# Patient Record
Sex: MALE | Race: WHITE | NOT HISPANIC OR LATINO | Employment: OTHER | URBAN - NONMETROPOLITAN AREA
[De-identification: names, ages, dates, MRNs, and addresses within clinical notes are randomized per-mention and may not be internally consistent; named-entity substitution may affect disease eponyms.]

---

## 2017-03-29 ENCOUNTER — OFFICE VISIT (OUTPATIENT)
Dept: CARDIOLOGY | Facility: CLINIC | Age: 82
End: 2017-03-29

## 2017-03-29 VITALS
BODY MASS INDEX: 27.07 KG/M2 | DIASTOLIC BLOOD PRESSURE: 69 MMHG | SYSTOLIC BLOOD PRESSURE: 137 MMHG | HEART RATE: 57 BPM | WEIGHT: 178.6 LBS | OXYGEN SATURATION: 98 % | HEIGHT: 68 IN

## 2017-03-29 DIAGNOSIS — I10 ESSENTIAL HYPERTENSION: ICD-10-CM

## 2017-03-29 DIAGNOSIS — I25.10 CORONARY ARTERY DISEASE INVOLVING NATIVE CORONARY ARTERY OF NATIVE HEART WITHOUT ANGINA PECTORIS: Primary | ICD-10-CM

## 2017-03-29 DIAGNOSIS — Z00.00 HEALTHCARE MAINTENANCE: ICD-10-CM

## 2017-03-29 DIAGNOSIS — R00.1 BRADYCARDIA: ICD-10-CM

## 2017-03-29 DIAGNOSIS — K59.09 OTHER CONSTIPATION: ICD-10-CM

## 2017-03-29 DIAGNOSIS — R68.89 COLD FEELING: ICD-10-CM

## 2017-03-29 DIAGNOSIS — H57.02 ANISOCORIA: ICD-10-CM

## 2017-03-29 DIAGNOSIS — R55 SYNCOPE, UNSPECIFIED SYNCOPE TYPE: ICD-10-CM

## 2017-03-29 PROBLEM — K59.00 CONSTIPATION: Status: ACTIVE | Noted: 2017-03-29

## 2017-03-29 PROCEDURE — 99204 OFFICE O/P NEW MOD 45 MIN: CPT | Performed by: INTERNAL MEDICINE

## 2017-03-29 PROCEDURE — 93000 ELECTROCARDIOGRAM COMPLETE: CPT | Performed by: INTERNAL MEDICINE

## 2017-03-29 RX ORDER — DOXAZOSIN MESYLATE 4 MG/1
4 TABLET ORAL NIGHTLY
COMMUNITY
Start: 2017-02-07 | End: 2023-02-16

## 2017-03-29 RX ORDER — RAMIPRIL 5 MG/1
5 CAPSULE ORAL DAILY
Qty: 30 CAPSULE | Refills: 3 | Status: SHIPPED | OUTPATIENT
Start: 2017-03-29 | End: 2017-05-30 | Stop reason: SDUPTHER

## 2017-03-29 RX ORDER — ASPIRIN 81 MG/1
81 TABLET ORAL DAILY
COMMUNITY

## 2017-03-29 RX ORDER — SIMVASTATIN 40 MG
40 TABLET ORAL NIGHTLY
COMMUNITY
End: 2020-09-02 | Stop reason: SDUPTHER

## 2017-03-29 RX ORDER — ALPRAZOLAM 1 MG/1
1 TABLET ORAL NIGHTLY PRN
COMMUNITY

## 2017-03-29 RX ORDER — RAMIPRIL 2.5 MG/1
CAPSULE ORAL DAILY
COMMUNITY
Start: 2017-03-02 | End: 2017-03-29

## 2017-03-29 NOTE — PROGRESS NOTES
Subjective   Israel Toure is a 84 y.o. male     Chief Complaint   Patient presents with   • Establish Care       PROBLEM LIST:     1. Coronary artery disease   1.1 acute MI 1990  2. Hypertension   3. COPD   4. carotid bruit BL   5. H/o syncope   Specialty Problems     None            HPI:  Mr. Juan Carlos Toure is an 84-year-old white male who presents today to [facility].    The patient has known atherosclerotic coronary artery disease and apparently underwent PTCA in 1990 after presenting with myocardial infarction following several months of angina.  He stopped smoking cigarettes at that time and has had no recurrence of similar chest pain.  Mr. Toure did extremely well for a number of years.  Approximately 2 years ago he had a syncopal episode.  He did not seek medical attention at that time.  Had no oral no palpitations no post ictal state or Paul's paralysis.    He continued to do well thereafter until January 2017.  At that time Mr. Toure was in the bathroom and had a syncopal episode.  Again he had no R, palpitations, or graying out.  He awoke on the floor without confusion, postictal state, or Paul's paralysis.  There is no loss of bowel or bladder control.  At that time Mr. Toure complained of a mild left precordial chest discomfort with radiation toward the left side of the neck.  He was admitted at [facility] and, because of borderline cardiac enzymes, underwent cardiac catheterization.  That study, performed by Dr. LOTTIE Peck, demonstrated chronic total occlusion of the right coronary artery with noncritical disease in the LAD and circumflex arteries.  Left ventricular ejection fraction was estimated at 40-45%.    Mr. Toure was taking Cardizem prior to his admission and the presumption was that he had a bradycardic syncopal episode.  Diltiazem was stopped, the patient was started on Altace, and was discharged in stable condition with no recurrent syncope and no  dizziness or presyncope.    Currently, Mr. Toure does well.  Since January he is been walking a half hour day on his treadmill and he has no chest discomfort or significant dyspnea.  He denies orthopnea or PND, he has very mild lower extremity edema when he has been on his feet for an extended period of time.  He has no palpitations, dizziness, presyncope or syncope.  He denies claudication or other symptoms of peripheral arterial disease or arterial embolic events.  A 30 day event recorder was performed through Dr. Peck's office after the patient's hospital discharge as well as a carotid duplex study.  The results of those studies are not currently available.                    CURRENT MEDICATION:    Current Outpatient Prescriptions   Medication Sig Dispense Refill   • ALPRAZolam (XANAX) 1 MG tablet Take 1 mg by mouth At Night As Needed for Anxiety.     • aspirin 81 MG EC tablet Take 81 mg by mouth Daily.     • doxazosin (CARDURA) 8 MG tablet Every Night.     • ramipril (ALTACE) 5 MG capsule 1 capsule Daily. 30 capsule 3   • simvastatin (ZOCOR) 80 MG tablet Take 80 mg by mouth Every Night.       No current facility-administered medications for this visit.        ALLERGIES:    Review of patient's allergies indicates no known allergies.    PAST MEDICAL HISTORY:    Past Medical History:   Diagnosis Date   • Acute MI    • COPD (chronic obstructive pulmonary disease)    • Diverticulosis    • Hypertension    • Hypomagnesemia    • Osteoarthritis    • Prostatitis    • Spinal stenosis        SURGICAL HISTORY:    Past Surgical History:   Procedure Laterality Date   • CARDIAC CATHETERIZATION     • CATARACT EXTRACTION     • HERNIA REPAIR     • SKIN CANCER EXCISION         SOCIAL HISTORY:    Social History     Social History   • Marital status:      Spouse name: N/A   • Number of children: N/A   • Years of education: N/A     Occupational History   • Not on file.     Social History Main Topics   • Smoking status:  "Former Smoker   • Smokeless tobacco: Not on file   • Alcohol use No   • Drug use: No   • Sexual activity: Defer     Other Topics Concern   • Not on file     Social History Narrative   • No narrative on file       FAMILY HISTORY:    Family History   Problem Relation Age of Onset   • No Known Problems Mother    • No Known Problems Father        Review of Systems   Constitutional: Negative.  Negative for chills, diaphoresis, fatigue and fever.   HENT: Negative.    Eyes: Positive for visual disturbance.   Respiratory: Negative.  Negative for chest tightness and shortness of breath (no orthopnea or PND).    Cardiovascular: Negative.  Negative for chest pain, palpitations and leg swelling.   Gastrointestinal: Positive for constipation (occasionally). Negative for abdominal pain, blood in stool (no melena, no hematuria, no hematemesis, no hematochezia ), diarrhea, nausea and vomiting.   Endocrine: Positive for cold intolerance. Negative for heat intolerance.   Genitourinary: Negative.    Musculoskeletal: Positive for myalgias.   Skin: Negative.    Allergic/Immunologic: Negative.    Neurological: Negative.  Negative for dizziness, tremors, seizures, syncope (1 epsisode in jan went to ER was followed by [facility] ), facial asymmetry, speech difficulty, weakness, light-headedness, numbness and headaches.   Hematological: Negative.    Psychiatric/Behavioral: Negative.        VISIT VITALS:    /69 (BP Location: Left arm, Patient Position: Sitting)  Pulse 57  Ht 68\" (172.7 cm)  Wt 178 lb 9.6 oz (81 kg)  SpO2 98%  BMI 27.16 kg/m2    RECENT LABS:    Objective       Physical Exam   Constitutional: He is oriented to person, place, and time. He appears well-developed and well-nourished. No distress.   HENT:   Head: Normocephalic and atraumatic.   Mouth/Throat: No oral lesions.   Eyes: Conjunctivae, EOM and lids are normal. Lids are everted and swept, no foreign bodies found. Right pupil is not reactive.   anisocoria  right " pupil dilated and does not react to light   Negative ptosis   Negative lid lag      Neck: Normal range of motion. Neck supple. Normal carotid pulses, no hepatojugular reflux and no JVD present. Carotid bruit is present (BL bruit louder left then right mid systolic, mid peaking). No thyroid mass and no thyromegaly present.   Cardiovascular: Normal heart sounds and intact distal pulses.   No extrasystoles are present. Exam reveals no gallop, no friction rub and no decreased pulses.    No murmur heard.  Pulses:       Radial pulses are 2+ on the right side, and 2+ on the left side.        Dorsalis pedis pulses are 2+ on the right side, and 2+ on the left side.        Posterior tibial pulses are 2+ on the right side, and 2+ on the left side.   Pulmonary/Chest: Effort normal and breath sounds normal. No respiratory distress. He has no decreased breath sounds. He has no wheezes. He has no rhonchi. He has no rales. He exhibits no tenderness.   Abdominal: Soft. Bowel sounds are normal. He exhibits no distension, no abdominal bruit and no mass. There is no tenderness.   Negative organomegaly      Musculoskeletal: He exhibits edema (1+ edema to above sock LLE).   Neurological: He is alert and oriented to person, place, and time. He has normal reflexes.   Skin: Skin is warm and dry. No rash noted. He is not diaphoretic. No erythema. No pallor.   Psychiatric: He has a normal mood and affect. His speech is normal and behavior is normal. Judgment and thought content normal. Cognition and memory are normal.   Nursing note and vitals reviewed.        ECG 12 Lead  Date/Time: 3/29/2017 1:36 PM  Performed by: YULI NORMAN  Authorized by: YULI NORMAN   Rhythm: sinus rhythm  Rate: normal  QRS axis: left  Comments: LVH by voltage  Cannot exclude IMIAU              Assessment/Plan    #1.  Coronary artery disease with chronic total occlusion of the right coronary artery, mild mildly to moderately depressed global LV systolic  function with an EF of 40-45% by catheterization, and noncritical disease in the left coronary artery.  The patient is currently on appropriate medical therapy.  Mr. Toure takes aspirin on an every other day basis when he bruises.  I recommended that he take that medication, as instructed, on a daily basis.  The patient is currently asymptomatic of ischemia and heart failure.    #2.  Episodic syncope.  According to hospital notes, providers felt that syncope was likely due to bradycardia with the patient on Cardizem.  Mr. Toure is had no recurrence of symptoms on Altace, and a 30 day event monitor was performed through Dr. Peck's office.  We will request those records.    #3.  Peripheral arterial disease with bilateral carotid bruits.  Dr. Peck also performed duplex study and we will request the results of that as well.    #4.  Mildly suboptimal blood pressure control.  As the patient is on a very low dose of Altace I would like to increase that to 5 mg daily and follow blood pressures at home.  The patient was given precautions reference symptomatic hypotension.    #5.  Mr. Toure will follow up with Dr. Heck as instructed, and in our office in 6-8 weeks or on a when necessary basis as discussed in detail today.               Diagnosis Plan   1. Coronary artery disease involving native coronary artery of native heart without angina pectoris  ECG 12 Lead    T3, Free    T4, Free    TSH    TSH    T4, Free    T3, Free   2. Healthcare maintenance  ECG 12 Lead    T3, Free    T4, Free    TSH    TSH    T4, Free    T3, Free   3. Cold feeling  T3, Free    T4, Free    TSH    TSH    T4, Free    T3, Free   4. Other constipation  T3, Free    T4, Free    TSH    TSH    T4, Free    T3, Free   5. Essential hypertension  T3, Free    T4, Free    TSH    TSH    T4, Free    T3, Free   6. Syncope, unspecified syncope type  T3, Free    T4, Free    TSH    TSH    T4, Free    T3, Free   7. Bradycardia     8. Anisocoria         Return  in about 2 months (around 5/29/2017), or if symptoms worsen or fail to improve, for Next scheduled follow up.         Braulio Burch MD

## 2017-05-30 ENCOUNTER — OFFICE VISIT (OUTPATIENT)
Dept: CARDIOLOGY | Facility: CLINIC | Age: 82
End: 2017-05-30

## 2017-05-30 VITALS
HEIGHT: 68 IN | SYSTOLIC BLOOD PRESSURE: 138 MMHG | OXYGEN SATURATION: 99 % | WEIGHT: 180.6 LBS | DIASTOLIC BLOOD PRESSURE: 69 MMHG | BODY MASS INDEX: 27.37 KG/M2 | HEART RATE: 56 BPM

## 2017-05-30 DIAGNOSIS — I25.10 CORONARY ARTERY DISEASE INVOLVING NATIVE CORONARY ARTERY OF NATIVE HEART WITHOUT ANGINA PECTORIS: Primary | ICD-10-CM

## 2017-05-30 DIAGNOSIS — I10 ESSENTIAL HYPERTENSION: ICD-10-CM

## 2017-05-30 PROCEDURE — 99213 OFFICE O/P EST LOW 20 MIN: CPT | Performed by: INTERNAL MEDICINE

## 2017-05-30 RX ORDER — TRAMADOL HYDROCHLORIDE 50 MG/1
50 TABLET ORAL AS NEEDED
COMMUNITY
Start: 2017-04-11

## 2017-05-30 RX ORDER — RAMIPRIL 5 MG/1
5 CAPSULE ORAL DAILY
Qty: 30 CAPSULE | Refills: 11 | Status: SHIPPED | OUTPATIENT
Start: 2017-05-30 | End: 2017-07-24 | Stop reason: SDUPTHER

## 2017-06-02 NOTE — PROGRESS NOTES
Subjective   Israel Toure is a 84 y.o. male     Chief Complaint   Patient presents with   • Follow-up     patient appears in office today for follow up        PROBLEM LIST:     1. Coronary artery disease   1.1 acute MI 1990  2. Hypertension   3. COPD   4. carotid bruit BL   5. H/o syncope     Specialty Problems        Cardiology Problems    Bradycardia        Coronary artery disease involving native coronary artery of native heart without angina pectoris        Essential hypertension        Syncope                HPI:  Mr. Toure returns for follow-up on the above problems.    He has had no recurrent presyncope or syncope.  He denies chest pain, orthopnea, PND, or change in mild lower extremity edema.  He has no palpitations, no symptoms of peripheral arterial disease nor of arterial embolic events.    We were able to review results of duplex study and event monitor.  The monitor demonstrated only occasional ventricular ectopic beats and sinus bradycardia with no significant pauses and no sustained dysrhythmia.  Duplex study demonstrated minimal carotid disease with antegrade flow both vertebral arteries.    Mr. Addison tolerated the increase in Altace without difficulty.  Her pressures remained well controlled.  By labs from March HDL cholesterol was 58 LDL cholesterol 69.                CURRENT MEDICATION:    Current Outpatient Prescriptions   Medication Sig Dispense Refill   • ALPRAZolam (XANAX) 1 MG tablet Take 1 mg by mouth At Night As Needed for Anxiety.     • aspirin 81 MG EC tablet Take 81 mg by mouth Daily.     • doxazosin (CARDURA) 8 MG tablet Every Night.     • ramipril (ALTACE) 5 MG capsule Take 1 capsule by mouth Daily. 30 capsule 11   • simvastatin (ZOCOR) 80 MG tablet Take 80 mg by mouth Every Night.     • traMADol (ULTRAM) 50 MG tablet As Needed.       No current facility-administered medications for this visit.        ALLERGIES:    Review of patient's allergies indicates no known  allergies.    PAST MEDICAL HISTORY:    Past Medical History:   Diagnosis Date   • Acute MI    • COPD (chronic obstructive pulmonary disease)    • Diverticulosis    • Hypertension    • Hypomagnesemia    • Osteoarthritis    • Prostatitis    • Spinal stenosis        SURGICAL HISTORY:    Past Surgical History:   Procedure Laterality Date   • CARDIAC CATHETERIZATION     • CATARACT EXTRACTION     • HERNIA REPAIR     • SKIN CANCER EXCISION         SOCIAL HISTORY:    Social History     Social History   • Marital status:      Spouse name: N/A   • Number of children: N/A   • Years of education: N/A     Occupational History   • Not on file.     Social History Main Topics   • Smoking status: Former Smoker   • Smokeless tobacco: Not on file   • Alcohol use No   • Drug use: No   • Sexual activity: Defer     Other Topics Concern   • Not on file     Social History Narrative       FAMILY HISTORY:    Family History   Problem Relation Age of Onset   • No Known Problems Mother    • No Known Problems Father        Review of Systems   Constitutional: Negative for fatigue.   HENT: Positive for hearing loss (wears hearing aides (forgot them today)).    Eyes: Positive for visual disturbance (wears reading glasses).   Respiratory: Negative for cough, chest tightness, shortness of breath and wheezing.    Cardiovascular: Negative.  Negative for chest pain, palpitations and leg swelling.   Gastrointestinal: Negative.  Negative for abdominal pain, nausea and vomiting.   Endocrine: Negative.  Negative for cold intolerance, heat intolerance and polyuria.   Genitourinary: Negative.  Negative for difficulty urinating, frequency and urgency.   Musculoskeletal: Positive for arthralgias, back pain, myalgias and neck pain. Negative for neck stiffness.   Skin: Negative.  Negative for rash and wound.   Allergic/Immunologic: Negative.  Negative for environmental allergies and food allergies.   Neurological: Negative.  Negative for dizziness,  "syncope, weakness, light-headedness, numbness and headaches.   Hematological: Bruises/bleeds easily.   Psychiatric/Behavioral: Negative for agitation, confusion and sleep disturbance. The patient is not nervous/anxious.        VISIT VITALS:    /69 (BP Location: Left arm, Patient Position: Sitting)  Pulse 56  Ht 68\" (172.7 cm)  Wt 180 lb 9.6 oz (81.9 kg)  SpO2 99%  BMI 27.46 kg/m2    RECENT LABS:        Physical Exam   Constitutional: He is oriented to person, place, and time. He appears well-developed and well-nourished. No distress.   HENT:   Head: Normocephalic and atraumatic.   Eyes: Conjunctivae, EOM and lids are normal. Pupils are equal, round, and reactive to light. Lids are everted and swept, no foreign bodies found.   Neck: Normal range of motion. Neck supple. Normal carotid pulses, no hepatojugular reflux and no JVD present. Carotid bruit is not present. No thyroid mass and no thyromegaly present.   Cardiovascular: S1 normal, S2 normal, normal heart sounds, intact distal pulses and normal pulses.   No extrasystoles are present. Exam reveals no gallop, no S3, no S4, no distant heart sounds, no friction rub and no decreased pulses.    No murmur heard.  Pulses:       Radial pulses are 2+ on the right side, and 2+ on the left side.        Dorsalis pedis pulses are 2+ on the right side, and 2+ on the left side.        Posterior tibial pulses are 2+ on the right side, and 2+ on the left side.   Pulmonary/Chest: Effort normal and breath sounds normal. No respiratory distress. He has no decreased breath sounds. He has no wheezes. He has no rhonchi. He has no rales. He exhibits no tenderness.   Abdominal: Soft. Bowel sounds are normal. He exhibits no distension, no abdominal bruit and no mass. There is no tenderness.   Negative organomegaly      Musculoskeletal: Normal range of motion. He exhibits edema (trace edema BLE). He exhibits no tenderness or deformity.   Lymphadenopathy:     He has no cervical " adenopathy.     He has no axillary adenopathy.   Neurological: He is alert and oriented to person, place, and time. He has normal reflexes.   Skin: Skin is warm and dry. No rash noted. He is not diaphoretic. No erythema. No pallor.   Psychiatric: He has a normal mood and affect. His speech is normal and behavior is normal. Judgment and thought content normal. Cognition and memory are normal.   Nursing note and vitals reviewed.      Procedures      Assessment/Plan    #1.  Mr. Toure is doing well from the standpoint of risk factor modification.  Head no significant disease on carotid duplex study, and blood pressure and cholesterol are equal.  Medications are appropriate, no current changes are indicated.    #2.  The patient has had no recurrence of presyncope or syncope has current medical therapy.  A vent monitor revealed no significant dysrhythmic substrate.  Therefore, we will continue current therapy.    #3.  From the standpoint of coronary artery disease Mr. Toure is also stable.  He is asymptomatic of ischemia heart failure and dysrhythmia with chronic total occlusion of the right coronary artery and noncritical left coronary disease documented by ANA a catheterization in January of this year.  We will continue aggressive efforts at risk modification.    #4.  Mr. Toure will follow-up with Dr. Heck as instructed, and in our office on a yearly basis, or on a when necessary basis for symptoms as discussed in detail today.             Diagnosis Plan   1. Coronary artery disease involving native coronary artery of native heart without angina pectoris     2. Essential hypertension         Return in about 1 year (around 5/30/2018), or if symptoms worsen or fail to improve, for Next scheduled follow up.         Braulio Burch MD

## 2017-07-24 ENCOUNTER — TELEPHONE (OUTPATIENT)
Dept: CARDIOLOGY | Facility: CLINIC | Age: 82
End: 2017-07-24

## 2017-07-24 DIAGNOSIS — I10 ESSENTIAL HYPERTENSION: Primary | ICD-10-CM

## 2017-07-24 RX ORDER — RAMIPRIL 5 MG/1
5 CAPSULE ORAL DAILY
Qty: 30 CAPSULE | Refills: 11 | Status: SHIPPED | OUTPATIENT
Start: 2017-07-24 | End: 2017-10-30 | Stop reason: SDUPTHER

## 2017-07-24 NOTE — TELEPHONE ENCOUNTER
----- Message from Milady MAURICE Schleicher sent at 7/24/2017 11:50 AM EDT -----  Contact: pt  Came in office needing refill on ramipril 5mg. Needs them to last until his next appt. Please advise. kg

## 2017-10-30 DIAGNOSIS — I10 ESSENTIAL HYPERTENSION: ICD-10-CM

## 2017-10-30 RX ORDER — RAMIPRIL 5 MG/1
5 CAPSULE ORAL DAILY
Qty: 90 CAPSULE | Refills: 1 | Status: SHIPPED | OUTPATIENT
Start: 2017-10-30 | End: 2018-05-07 | Stop reason: SDUPTHER

## 2018-02-12 ENCOUNTER — OFFICE VISIT (OUTPATIENT)
Dept: CARDIOLOGY | Facility: CLINIC | Age: 83
End: 2018-02-12

## 2018-02-12 VITALS
HEIGHT: 69 IN | HEART RATE: 59 BPM | BODY MASS INDEX: 25.24 KG/M2 | DIASTOLIC BLOOD PRESSURE: 66 MMHG | SYSTOLIC BLOOD PRESSURE: 148 MMHG | OXYGEN SATURATION: 98 % | WEIGHT: 170.4 LBS

## 2018-02-12 DIAGNOSIS — R00.0 TACHYCARDIA: ICD-10-CM

## 2018-02-12 DIAGNOSIS — R00.2 PALPITATIONS: ICD-10-CM

## 2018-02-12 DIAGNOSIS — I25.118 CORONARY ARTERY DISEASE OF NATIVE ARTERY OF NATIVE HEART WITH STABLE ANGINA PECTORIS (HCC): ICD-10-CM

## 2018-02-12 DIAGNOSIS — I10 ESSENTIAL HYPERTENSION: ICD-10-CM

## 2018-02-12 DIAGNOSIS — I73.9 CLAUDICATION OF BOTH LOWER EXTREMITIES (HCC): ICD-10-CM

## 2018-02-12 PROCEDURE — 93000 ELECTROCARDIOGRAM COMPLETE: CPT | Performed by: PHYSICIAN ASSISTANT

## 2018-02-12 PROCEDURE — 99214 OFFICE O/P EST MOD 30 MIN: CPT | Performed by: PHYSICIAN ASSISTANT

## 2018-02-12 RX ORDER — TAMSULOSIN HYDROCHLORIDE 0.4 MG/1
1 CAPSULE ORAL DAILY
COMMUNITY
Start: 2017-12-13 | End: 2018-08-03

## 2018-02-12 NOTE — PROGRESS NOTES
Problem list     Subjective   Israel Toure is a 84 y.o. male     Chief Complaint   Patient presents with   • Follow-up     patient appears in office today for yearly follow up    • Coronary Artery Disease   • Hypertension   Problem List:  1.  Coronary artery disease.  1.1.  MI, 1990s, inadequate data.  The patient apparently had catheterization with no follow-up mechanical intervention.  1.2.  Repeat catheterization, 2017, in the setting of unstable angina.  The patient had an occluded right coronary artery with adequate collateral flow.  He had nonobstructive disease in the LAD and circumflex.  Medical management was recommended.  2.  Ischemic cardiomyopathy, estimated EF at 40-45%.  3.  Hypertension  4.  Dyslipidemia  5.  Chronic palpitations with questionable diagnosis of atrial fibrillation, inadequate data.    HPI  The patient present back today for evaluation and follow-up.  He is worried with episodes of palpitations/tachycardia.  He describes episodes of irregularities followed by a very tachycardic sensation.  This can last several minutes and then tends to resolve without any type of intervention.  He has been seen through the emergency room previously, and 2017, for similar symptoms.  He was admitted at that time and had catheterization as above.  The daughter, who is with him today, reports that there was mention of atrial fibrillation, but I do not see a formal diagnosis of that from that hospitalization.  The patient reports that his symptoms of palpitations of been very minimal over the past 2 months.  He relates that to not taking tramadol.  He feels that that medication induces episodes of dysrhythmias.  The patient has no chest pain or tightness of significance.  He has only rare chest discomfort when his palpitations and tachycardic episodes are very significant.  He reports stable dyspnea.  He has no failure symptoms.  He has had no further syncopal episodes, which again was experienced back  in 2017.  He denies further complaints otherwise.    Current Outpatient Prescriptions   Medication Sig Dispense Refill   • ALPRAZolam (XANAX) 1 MG tablet Take 1 mg by mouth At Night As Needed for Anxiety.     • aspirin 81 MG EC tablet Take 81 mg by mouth Daily.     • doxazosin (CARDURA) 8 MG tablet Take 4 mg by mouth Every Night.     • ramipril (ALTACE) 5 MG capsule Take 1 capsule by mouth Daily. 90 capsule 1   • simvastatin (ZOCOR) 80 MG tablet Take 40 mg by mouth Every Night.     • tamsulosin (FLOMAX) 0.4 MG capsule 24 hr capsule 1 capsule Daily.     • traMADol (ULTRAM) 50 MG tablet Take 50 mg by mouth As Needed for Moderate Pain .       No current facility-administered medications for this visit.        Review of patient's allergies indicates no known allergies.    Past Medical History:   Diagnosis Date   • Acute MI    • Cancer     melanoma   • COPD (chronic obstructive pulmonary disease)    • Diverticulosis    • Hypertension    • Hypomagnesemia    • Osteoarthritis    • Prostatitis    • Spinal stenosis        Social History     Social History   • Marital status:      Spouse name: N/A   • Number of children: N/A   • Years of education: N/A     Occupational History   • Not on file.     Social History Main Topics   • Smoking status: Former Smoker   • Smokeless tobacco: Never Used   • Alcohol use No   • Drug use: No   • Sexual activity: Defer     Other Topics Concern   • Not on file     Social History Narrative       Family History   Problem Relation Age of Onset   • No Known Problems Mother    • No Known Problems Father        Review of Systems   Constitutional: Negative.  Negative for fatigue.   HENT: Positive for hearing loss (B hearing loss). Negative for congestion, rhinorrhea, sneezing and sore throat.    Eyes: Positive for visual disturbance (wears reading glasses).   Respiratory: Positive for shortness of breath (SOA with exertion). Negative for apnea, cough, chest tightness and wheezing.   "  Cardiovascular: Positive for palpitations (occasional palpitations/flutters). Negative for chest pain (denies CP) and leg swelling.   Gastrointestinal: Positive for abdominal pain (with constipation ) and constipation (chronic constipation ). Negative for abdominal distention, nausea and vomiting.   Endocrine: Negative.  Negative for cold intolerance, heat intolerance, polyphagia and polyuria.   Genitourinary: Negative.  Negative for difficulty urinating, frequency and urgency.   Musculoskeletal: Positive for back pain (due to spinal stenosis) and neck pain (due to spinal stenosis). Negative for arthralgias and neck stiffness.   Skin: Negative.  Negative for rash and wound.   Allergic/Immunologic: Negative.  Negative for environmental allergies and food allergies.   Neurological: Positive for syncope (x2 syncopal epsidoes over 1 year ago). Negative for dizziness, weakness, light-headedness and headaches.   Hematological: Bruises/bleeds easily (bruises and bleeds easily).   Psychiatric/Behavioral: Negative for agitation, confusion and sleep disturbance (denies waking up smothering/SOA). The patient is nervous/anxious (easily nervous/anxious).        Objective   Vitals:    02/12/18 1323   BP: 148/66   BP Location: Left arm   Patient Position: Sitting   Pulse: 59   SpO2: 98%   Weight: 77.3 kg (170 lb 6.4 oz)   Height: 175.3 cm (69\")      /66 (BP Location: Left arm, Patient Position: Sitting)  Pulse 59  Ht 175.3 cm (69\")  Wt 77.3 kg (170 lb 6.4 oz)  SpO2 98%  BMI 25.16 kg/m2   Lab Results (most recent)     None        Physical Exam   Constitutional: He is oriented to person, place, and time. He appears well-developed and well-nourished. No distress.   HENT:   Head: Normocephalic and atraumatic.   Eyes: Conjunctivae, EOM and lids are normal. Pupils are equal, round, and reactive to light. Lids are everted and swept, no foreign bodies found.   Neck: Normal range of motion. Neck supple. Normal carotid pulses, " no hepatojugular reflux and no JVD present. Carotid bruit is not present. No thyroid mass and no thyromegaly present.   Cardiovascular: S1 normal, S2 normal, normal heart sounds, intact distal pulses and normal pulses.   No extrasystoles are present. Exam reveals no gallop, no S3, no S4, no distant heart sounds, no friction rub and no decreased pulses.    No murmur heard.  Pulses:       Radial pulses are 2+ on the right side, and 2+ on the left side.        Dorsalis pedis pulses are 2+ on the right side, and 2+ on the left side.        Posterior tibial pulses are 2+ on the right side, and 2+ on the left side.   Pulmonary/Chest: Effort normal and breath sounds normal. No respiratory distress. He has no decreased breath sounds. He has no wheezes. He has no rhonchi. He has no rales. He exhibits no tenderness.   Abdominal: Soft. Bowel sounds are normal. He exhibits no distension, no abdominal bruit and no mass. There is no tenderness.   Negative organomegaly      Musculoskeletal: Normal range of motion. He exhibits edema (trace edema BLE). He exhibits no tenderness or deformity.   Lymphadenopathy:     He has no cervical adenopathy.     He has no axillary adenopathy.   Neurological: He is alert and oriented to person, place, and time. He has normal reflexes.   Skin: Skin is warm and dry. No rash noted. He is not diaphoretic. No erythema. No pallor.   Psychiatric: He has a normal mood and affect. His speech is normal and behavior is normal. Judgment and thought content normal. Cognition and memory are normal.   Nursing note and vitals reviewed.        Procedure     ECG 12 Lead  Date/Time: 2/12/2018 1:30 PM  Performed by: MADISON LYONS  Authorized by: MADISON LYONS   Comments: HTN               Assessment/Plan      Diagnosis Plan   1. Palpitations  Adult Transthoracic Echo Complete W/ Cont if Necessary Per Protocol    Cardiac Event Monitor    US Arterial Doppler Lower Extremity Left    US Arterial Doppler Lower  Extremity Right    Adult Transthoracic Echo Complete W/ Cont if Necessary Per Protocol    Cardiac Event Monitor    US Arterial Doppler Lower Extremity Left    US Arterial Doppler Lower Extremity Right   2. Tachycardia  Adult Transthoracic Echo Complete W/ Cont if Necessary Per Protocol    Cardiac Event Monitor    US Arterial Doppler Lower Extremity Left    US Arterial Doppler Lower Extremity Right    Adult Transthoracic Echo Complete W/ Cont if Necessary Per Protocol    Cardiac Event Monitor    US Arterial Doppler Lower Extremity Left    US Arterial Doppler Lower Extremity Right   3. Essential hypertension  ECG 12 Lead    Adult Transthoracic Echo Complete W/ Cont if Necessary Per Protocol    Cardiac Event Monitor    US Arterial Doppler Lower Extremity Left    US Arterial Doppler Lower Extremity Right    Adult Transthoracic Echo Complete W/ Cont if Necessary Per Protocol    Cardiac Event Monitor    US Arterial Doppler Lower Extremity Left    US Arterial Doppler Lower Extremity Right   4. Coronary artery disease of native artery of native heart with stable angina pectoris  Adult Transthoracic Echo Complete W/ Cont if Necessary Per Protocol    Cardiac Event Monitor    US Arterial Doppler Lower Extremity Left    US Arterial Doppler Lower Extremity Right    Adult Transthoracic Echo Complete W/ Cont if Necessary Per Protocol    Cardiac Event Monitor    US Arterial Doppler Lower Extremity Left    US Arterial Doppler Lower Extremity Right   5. Claudication of both lower extremities  US Arterial Doppler Lower Extremity Left    US Arterial Doppler Lower Extremity Right    US Arterial Doppler Lower Extremity Left    US Arterial Doppler Lower Extremity Right       I would like to schedule for an echo and an event monitor given all symptoms as above.  The patient is concerned about his palpitations/tachycardia.  We need to evaluate for dysrhythmias.  For now, I will make no changes in medications.  I do feel that the patient  would benefit from beta blocker therapy.  I do not want institute that until I can see results of the above.  Also, not mentioned above, the patient has pain in the lower extremities possibly representing claudication type issues.  Pulses are not significantly diminished but I would like to schedule for an arterial duplex of lower extremities bilaterally.  For any complications, the patient will call to us.  Otherwise, further pending above.               Electronically signed by:

## 2018-02-21 ENCOUNTER — HOSPITAL ENCOUNTER (OUTPATIENT)
Dept: CARDIOLOGY | Facility: HOSPITAL | Age: 83
Discharge: HOME | End: 2018-02-21

## 2018-02-21 ENCOUNTER — HOSPITAL ENCOUNTER (OUTPATIENT)
Dept: CARDIOLOGY | Facility: HOSPITAL | Age: 83
Discharge: HOME | End: 2018-02-21
Admitting: PHYSICIAN ASSISTANT

## 2018-02-21 ENCOUNTER — OUTSIDE FACILITY SERVICE (OUTPATIENT)
Dept: CARDIOLOGY | Facility: CLINIC | Age: 83
End: 2018-02-21

## 2018-02-21 DIAGNOSIS — I25.118 CORONARY ARTERY DISEASE OF NATIVE ARTERY OF NATIVE HEART WITH STABLE ANGINA PECTORIS (HCC): ICD-10-CM

## 2018-02-21 DIAGNOSIS — I10 ESSENTIAL HYPERTENSION: ICD-10-CM

## 2018-02-21 DIAGNOSIS — I73.9 CLAUDICATION OF BOTH LOWER EXTREMITIES (HCC): ICD-10-CM

## 2018-02-21 LAB
MAXIMAL PREDICTED HEART RATE: 136 BPM
STRESS TARGET HR: 116 BPM

## 2018-02-21 PROCEDURE — 93306 TTE W/DOPPLER COMPLETE: CPT

## 2018-02-21 PROCEDURE — 93306 TTE W/DOPPLER COMPLETE: CPT | Performed by: INTERNAL MEDICINE

## 2018-02-21 PROCEDURE — 93925 LOWER EXTREMITY STUDY: CPT | Performed by: INTERNAL MEDICINE

## 2018-02-21 PROCEDURE — 93925 LOWER EXTREMITY STUDY: CPT

## 2018-02-23 ENCOUNTER — DOCUMENTATION (OUTPATIENT)
Dept: CARDIOLOGY | Facility: CLINIC | Age: 83
End: 2018-02-23

## 2018-02-23 DIAGNOSIS — I73.9 CLAUDICATION OF LEFT LOWER EXTREMITY (HCC): Primary | ICD-10-CM

## 2018-02-23 NOTE — PROGRESS NOTES
Patient was notified of echo results. Patient was notified to keep follow up apt as scheduled. -ASHLEY;LIZ

## 2018-03-07 ENCOUNTER — OUTSIDE FACILITY SERVICE (OUTPATIENT)
Dept: CARDIOLOGY | Facility: CLINIC | Age: 83
End: 2018-03-07

## 2018-03-07 PROCEDURE — 93228 REMOTE 30 DAY ECG REV/REPORT: CPT | Performed by: INTERNAL MEDICINE

## 2018-04-10 ENCOUNTER — OFFICE VISIT (OUTPATIENT)
Dept: CARDIOLOGY | Facility: CLINIC | Age: 83
End: 2018-04-10

## 2018-04-10 VITALS
BODY MASS INDEX: 25.65 KG/M2 | SYSTOLIC BLOOD PRESSURE: 148 MMHG | DIASTOLIC BLOOD PRESSURE: 68 MMHG | WEIGHT: 173.2 LBS | HEART RATE: 59 BPM | HEIGHT: 69 IN | OXYGEN SATURATION: 99 %

## 2018-04-10 DIAGNOSIS — I10 ESSENTIAL HYPERTENSION: ICD-10-CM

## 2018-04-10 DIAGNOSIS — I25.10 CORONARY ARTERY DISEASE INVOLVING NATIVE CORONARY ARTERY OF NATIVE HEART WITHOUT ANGINA PECTORIS: Primary | ICD-10-CM

## 2018-04-10 DIAGNOSIS — I25.5 ISCHEMIC CARDIOMYOPATHY: ICD-10-CM

## 2018-04-10 DIAGNOSIS — I73.9 PVD (PERIPHERAL VASCULAR DISEASE) (HCC): ICD-10-CM

## 2018-04-10 PROCEDURE — 99214 OFFICE O/P EST MOD 30 MIN: CPT | Performed by: PHYSICIAN ASSISTANT

## 2018-04-10 RX ORDER — CARVEDILOL 3.12 MG/1
3.12 TABLET ORAL 2 TIMES DAILY
Qty: 60 TABLET | Refills: 5 | Status: SHIPPED | OUTPATIENT
Start: 2018-04-10 | End: 2018-10-19 | Stop reason: SDUPTHER

## 2018-04-10 NOTE — PATIENT INSTRUCTIONS

## 2018-04-10 NOTE — PROGRESS NOTES
Problem list     Subjective   Israel Toure is a 85 y.o. male     Chief Complaint   Patient presents with   • Follow-up     patient appears in office today for follow up test results    • Palpitations   Problem List:  1.  Coronary artery disease.  1.1.  MI, 1990s, inadequate data.  The patient apparently had catheterization with no follow-up mechanical intervention.  1.2.  Repeat catheterization, 2017, in the setting of unstable angina.  The patient had an occluded right coronary artery with adequate collateral flow.  He had nonobstructive disease in the LAD and circumflex.  Medical management was recommended.  2.  Ischemic cardiomyopathy, estimated EF at 40-45%.  3.  Hypertension  4.  Dyslipidemia  5.  Chronic palpitations with questionable diagnosis of atrial fibrillation, inadequate data.    HPI  The patient presents in today for follow-up on event monitor, echo, and lower extremity arterial duplex study.  He was scheduled for those studies because of shortness of air, to reevaluate systolic function, and evaluate the patient's complaints of leg pain.  Echocardiogram suggested an EF of 35-40%.  Event monitor indicated PVCs.  The patient had an episode of nonsustained V. tach.  Lower extremity arterial duplex suggested moderate to moderately severe disease bilateral lower extremities.  He was scheduled for CTA to evaluate that further.  CTA suggested borderline hemodynamically significant disease and bilateral lower extremity is.  He presents today to discuss those tests.  We reviewed the patient's vascular disease status.  We offered him consideration for vaginal surgery referral.  He would like to hold on that.  I'm also concerned with his EF at 35-40% and nonsustained V. tach by event monitor.  The patient does report a history of syncope ×2.  We discussed EP evaluation.  He wants to hold on that for now as well.  The patient has stable dyspnea.  He denies chest pain.  He has no failure symptoms.  He has no  further complaints otherwise.    Current Outpatient Prescriptions   Medication Sig Dispense Refill   • ALPRAZolam (XANAX) 1 MG tablet Take 1 mg by mouth At Night As Needed for Anxiety.     • aspirin 81 MG EC tablet Take 81 mg by mouth Daily.     • doxazosin (CARDURA) 8 MG tablet Take 4 mg by mouth Every Night.     • ramipril (ALTACE) 5 MG capsule Take 1 capsule by mouth Daily. 90 capsule 1   • simvastatin (ZOCOR) 80 MG tablet Take 40 mg by mouth Every Night.     • tamsulosin (FLOMAX) 0.4 MG capsule 24 hr capsule 1 capsule Daily.     • traMADol (ULTRAM) 50 MG tablet Take 50 mg by mouth As Needed for Moderate Pain .       No current facility-administered medications for this visit.        Review of patient's allergies indicates no known allergies.    Past Medical History:   Diagnosis Date   • Acute MI    • Cancer     melanoma   • COPD (chronic obstructive pulmonary disease)    • Diverticulosis    • Hypertension    • Hypomagnesemia    • Osteoarthritis    • Prostatitis    • Spinal stenosis        Social History     Social History   • Marital status:      Spouse name: N/A   • Number of children: N/A   • Years of education: N/A     Occupational History   • Not on file.     Social History Main Topics   • Smoking status: Former Smoker   • Smokeless tobacco: Never Used   • Alcohol use No   • Drug use: No   • Sexual activity: Defer     Other Topics Concern   • Not on file     Social History Narrative   • No narrative on file       Family History   Problem Relation Age of Onset   • No Known Problems Mother    • No Known Problems Father        Review of Systems   Constitutional: Negative for fatigue.   HENT: Positive for hearing loss (B) hearing loss. Negative for congestion, rhinorrhea, sneezing and sore throat.    Eyes: Positive for visual disturbance (wears reading glasses).   Respiratory: Positive for shortness of breath (with exertion only). Negative for apnea, cough, chest tightness and wheezing.    Cardiovascular:  "Positive for leg swelling (B swelling/edema). Negative for chest pain (denies CP) and palpitations.   Gastrointestinal: Negative.  Negative for abdominal distention, abdominal pain, nausea and vomiting.   Endocrine: Negative.  Negative for cold intolerance, heat intolerance, polyphagia and polyuria.   Genitourinary: Negative.  Negative for difficulty urinating, frequency and urgency.   Musculoskeletal: Positive for arthralgias (joints), back pain (due to arthritis; spinal stenosis), neck pain (due to arthritis) and neck stiffness (due to arthritis).   Skin: Negative.  Negative for rash and wound.   Allergic/Immunologic: Negative.  Negative for environmental allergies and food allergies.   Neurological: Negative.  Negative for dizziness, weakness, light-headedness and headaches.   Hematological: Bruises/bleeds easily (bruises easily).   Psychiatric/Behavioral: Negative.  Negative for agitation, confusion and sleep disturbance (denies waking up smothering/SOA). The patient is not nervous/anxious.        Objective   Vitals:    04/10/18 1439   BP: 148/68   BP Location: Left arm   Patient Position: Sitting   Pulse: 59   SpO2: 99%   Weight: 78.6 kg (173 lb 3.2 oz)   Height: 175.3 cm (69\")      /68 (BP Location: Left arm, Patient Position: Sitting)   Pulse 59   Ht 175.3 cm (69\")   Wt 78.6 kg (173 lb 3.2 oz)   SpO2 99%   BMI 25.58 kg/m²    Lab Results (most recent)     None        Physical Exam   Constitutional: He is oriented to person, place, and time. He appears well-developed and well-nourished. No distress.   HENT:   Head: Normocephalic and atraumatic.   Eyes: Conjunctivae, EOM and lids are normal. Pupils are equal, round, and reactive to light. Lids are everted and swept, no foreign bodies found.   Neck: Normal range of motion. Neck supple. Normal carotid pulses, no hepatojugular reflux and no JVD present. Carotid bruit is not present. No thyroid mass and no thyromegaly present.   Cardiovascular: S1 normal, " S2 normal, normal heart sounds, intact distal pulses and normal pulses.   No extrasystoles are present. Exam reveals no gallop, no S3, no S4, no distant heart sounds, no friction rub and no decreased pulses.    No murmur heard.  Pulses:       Radial pulses are 2+ on the right side, and 2+ on the left side.        Dorsalis pedis pulses are 2+ on the right side, and 2+ on the left side.        Posterior tibial pulses are 2+ on the right side, and 2+ on the left side.   Pulmonary/Chest: Effort normal and breath sounds normal. No respiratory distress. He has no decreased breath sounds. He has no wheezes. He has no rhonchi. He has no rales. He exhibits no tenderness.   Abdominal: Soft. Bowel sounds are normal. He exhibits no distension, no abdominal bruit and no mass. There is no tenderness.   Negative organomegaly      Musculoskeletal: Normal range of motion. He exhibits edema (trace edema BLE). He exhibits no tenderness or deformity.   Lymphadenopathy:     He has no cervical adenopathy.     He has no axillary adenopathy.   Neurological: He is alert and oriented to person, place, and time. He has normal reflexes.   Skin: Skin is warm and dry. No rash noted. He is not diaphoretic. No erythema. No pallor.   Psychiatric: He has a normal mood and affect. His speech is normal and behavior is normal. Judgment and thought content normal. Cognition and memory are normal.   Nursing note and vitals reviewed.        Procedure   Procedures       Assessment/Plan      Diagnosis Plan   1. Coronary artery disease involving native coronary artery of native heart without angina pectoris     2. Ischemic cardiomyopathy     3. Essential hypertension     4. PVD (peripheral vascular disease)         The patient has diffuse lower extremity vascular disease.  He does not want to pursue vascular surgery evaluation at this time.  Should he change his mind, he will call.  I am also concerned given his EF of 35%, nonsustained V. tach by the  monitor, and history of syncope ×2.  We discussed consideration for EP evaluation.  He would like to hold on that.  We discussed risk of sudden cardiac death.  With his ischemic cardiomyopathy, he would benefit from beta blocker therapy.  He has had a degree of bradycardia in the past.  I'll start very low-dose Coreg and he will monitor heart rate and blood pressures very closely at home.  We will see him back in 4-6 weeks just to reevaluate course.  He will call for any issues prior to follow-up.           Discussed the patient's BMI with him. BMI is above normal parameters. Follow-up plan includes:  educational material and referral to primary care.             Electronically signed by:

## 2018-05-07 ENCOUNTER — TELEPHONE (OUTPATIENT)
Dept: CARDIOLOGY | Facility: CLINIC | Age: 83
End: 2018-05-07

## 2018-05-07 DIAGNOSIS — I10 ESSENTIAL HYPERTENSION: ICD-10-CM

## 2018-05-07 RX ORDER — RAMIPRIL 5 MG/1
5 CAPSULE ORAL DAILY
Qty: 90 CAPSULE | Refills: 1 | Status: SHIPPED | OUTPATIENT
Start: 2018-05-07 | End: 2018-10-26 | Stop reason: SDUPTHER

## 2018-05-07 NOTE — TELEPHONE ENCOUNTER
----- Message from Kelly Monahan sent at 5/7/2018 11:47 AM EDT -----  Contact: PATIENT  THE PATIENT STOPPED BY AND REQUESTED A REFILL ON HIS RAMIPRIL.  HE USES [facility] IN [place].  WITH ANY ISSUES HE CAN BE REACHED [phone].  THANKS

## 2018-07-05 ENCOUNTER — TELEPHONE (OUTPATIENT)
Dept: CARDIOLOGY | Facility: CLINIC | Age: 83
End: 2018-07-05

## 2018-07-05 NOTE — TELEPHONE ENCOUNTER
PER VERBAL ORDER MADISON LYONS PA-C ,  PATIENTS PCP CALLED HIM VIA CELL WHILE ON VACATION AND DISCUSSED PATIENT.     MADISON LYONS PA-C REQ A LIMITED ECHO BE PERFORMED ON PATIENT.     I ATTEMPTED TO CALL PATIENT AT 0240 PM , NO ANSWER, LEFT VM TO RETURN CALL. -;[facility]

## 2018-07-05 NOTE — TELEPHONE ENCOUNTER
PATIENT CALLED BACK @ 0424 PM. HE STATED WHILE AT  OFFICE THEY WENT AHEAD AND SCHEDULED HER WITH DR. CEE. PER VERBAL ORDER MADISON LYONS PA-C WE WILL WAIT TILL PATIENT SEE'S DR. CEE AND SEE WHAT HIS PLAN OF ACTION IS. -;[facility]

## 2018-08-03 ENCOUNTER — OFFICE VISIT (OUTPATIENT)
Dept: CARDIOLOGY | Facility: CLINIC | Age: 83
End: 2018-08-03

## 2018-08-03 VITALS
HEART RATE: 55 BPM | WEIGHT: 163 LBS | DIASTOLIC BLOOD PRESSURE: 66 MMHG | HEIGHT: 67 IN | BODY MASS INDEX: 25.58 KG/M2 | OXYGEN SATURATION: 98 % | SYSTOLIC BLOOD PRESSURE: 140 MMHG

## 2018-08-03 DIAGNOSIS — I25.10 CORONARY ARTERY DISEASE INVOLVING NATIVE CORONARY ARTERY OF NATIVE HEART WITHOUT ANGINA PECTORIS: Primary | ICD-10-CM

## 2018-08-03 DIAGNOSIS — I10 ESSENTIAL HYPERTENSION: ICD-10-CM

## 2018-08-03 DIAGNOSIS — I42.9 CARDIOMYOPATHY, UNSPECIFIED TYPE (HCC): ICD-10-CM

## 2018-08-03 DIAGNOSIS — R55 SYNCOPE, UNSPECIFIED SYNCOPE TYPE: ICD-10-CM

## 2018-08-03 DIAGNOSIS — I47.29 NSVT (NONSUSTAINED VENTRICULAR TACHYCARDIA) (HCC): ICD-10-CM

## 2018-08-03 PROCEDURE — 99214 OFFICE O/P EST MOD 30 MIN: CPT | Performed by: INTERNAL MEDICINE

## 2018-08-03 PROCEDURE — 93000 ELECTROCARDIOGRAM COMPLETE: CPT | Performed by: INTERNAL MEDICINE

## 2018-08-03 NOTE — PROGRESS NOTES
Israel Toure  2/26/1933    There is no work phone number on file.    08/03/2018    [facility] CARDIOLOGY    Braulio Heck MD  [address] [place] [address]    REFERRING DOCTOR: Dr. Burch      Patient ID: Israel Toure is a 85 y.o. male.    Chief Complaint: Cardiomyopathy  Problem List:  1.  Coronary artery disease.   1.1.  MI, 1990s, inadequate data.  The patient apparently had catheterization with no follow-up mechanical intervention.   1.2.  Repeat catheterization, 2017, in the setting of unstable angina.  The patient had an occluded right coronary artery with adequate collateral flow.  He had nonobstructive disease in the LAD and circumflex.  Medical management was recommended.  2.  Ischemic cardiomyopathy   2.1 Echocardiogram ~2017 EF 40-45%, Dr. Peck at [facility]- data deficit   2.2 Echocardiogram 2/21/18: EF 35-40%, grade II diastolic dysfunction, LA moderately dilated, RA mildly dilated, mild MR, mild TR, mild WV   2.3 Holter monitor 2/2018: 4 beat run of NSVT, asymptomatic   2.4 RBBB, -160 ms  3.  Hypertension  4.  Dyslipidemia  5.  Chronic palpitations with questionable diagnosis of atrial fibrillation, inadequate data.    Allergies:   No Known Allergies    Current Medications:    Current Outpatient Prescriptions:   •  ALPRAZolam (XANAX) 1 MG tablet, Take 2 mg by mouth At Night As Needed for Anxiety., Disp: , Rfl:   •  aspirin 81 MG EC tablet, Take 81 mg by mouth Daily., Disp: , Rfl:   •  carvedilol (COREG) 3.125 MG tablet, Take 1 tablet by mouth 2 (Two) Times a Day., Disp: 60 tablet, Rfl: 5  •  doxazosin (CARDURA) 8 MG tablet, Take 4 mg by mouth Every Night., Disp: , Rfl:   •  ramipril (ALTACE) 5 MG capsule, Take 1 capsule by mouth Daily., Disp: 90 capsule, Rfl: 1  •  simvastatin (ZOCOR) 80 MG tablet, Take 40 mg by mouth Every Night., Disp: , Rfl:   •  traMADol (ULTRAM) 50 MG tablet, Take 50 mg by mouth As Needed for Moderate Pain ., Disp: , Rfl:      History of Present Illness: Ms. Toure is an 86 y/o male with the above noted pmhx who is seen today in consultation at the request of Mike Patricia PA-C for cardiomyopathy. He has had a reduced EF of 40-45% in 2017 although we do not have copy of this. He had repeat echocardiogram on 2/21/18 with an EF of 35-40%. He has been on optimal medical therapy with  Altace for greater than 90 days (started Feb. 2018) and on Coreg since April 2018. This was initially not started s/t bradycardia and hypotension at times. He wore Holter monitor in February which demonstrated 4 beat run of NSVT, asymptotic at the time but no other arrhythmia noted. EKG demonstrates chronic RBBB with QRS of 150-160 ms. He has had two episodes of syncope. One was about three years ago and the second was 1.5 years ago. Prior to onset he felt a warm sensation come over his body. No palpitations prior to onset or other symptoms. With one episode he was evaluated at [facility] and noted to have some VT on telemetry but it is unclear if this corresponded to his symptoms. He is pretty active. He is able to do his yard work and household chores. His daughter states that he does get tired and has to take breaks. He does not feel like he has reduced energy level. He denies SOP, CP, PND and orthopnea.     Both episodes of syncope seem to be consistent with neurocardiogenic with prodrome of LH and a feeling of doom with no sig palpitations. Also dealing with LE pain and taking pain pills he feels better. NSVT on monitor with no symptoms of up to 4 beats.     The following portions of the patient's history were reviewed and updated as appropriate: allergies, current medications, past family history, past medical history, past social history, past surgical history and problem list.      Past Medical History:   Diagnosis Date   • Acute MI    • Cancer (CMS/[facility])     melanoma   • COPD (chronic obstructive pulmonary disease) (CMS/[facility])    •  Diverticulosis    • Hypertension    • Hypomagnesemia    • Osteoarthritis    • Prostatitis    • Spinal stenosis        Past Surgical History:   Procedure Laterality Date   • CARDIAC CATHETERIZATION     • CATARACT EXTRACTION     • HERNIA REPAIR     • SKIN CANCER EXCISION         Social History     Social History   • Marital status:      Spouse name: N/A   • Number of children: N/A   • Years of education: N/A     Occupational History   • Not on file.     Social History Main Topics   • Smoking status: Former Smoker     Types: Cigarettes     Quit date: 1990   • Smokeless tobacco: Never Used   • Alcohol use No   • Drug use: No   • Sexual activity: Defer     Other Topics Concern   • Not on file     Social History Narrative   • No narrative on file       Family History   Problem Relation Age of Onset   • No Known Problems Mother    • No Known Problems Father        REVIEW OF SYSTEMS:   CONSTITUTIONAL: No weight loss, fever, chills. + Fatigue  HEENT: Eyes: No visual loss, blurred vision, double vision or yellow sclerae. Ears, Nose, Throat: No hearing loss, sneezing, congestion, runny nose or sore throat.   SKIN: No rash or itching.     RESPIRATORY: No shortness of breath, hemoptysis, cough or sputum.   GASTROINTESTINAL: No anorexia, nausea, vomiting or diarrhea. No abdominal pain, bright red blood per rectum or melena.  GENITOURINARY: No burning on urination, hematuria or increased frequency.  NEUROLOGICAL: No headache, dizziness, syncope, paralysis, ataxia, numbness or tingling in the extremities. No change in bowel or bladder control.   MUSCULOSKELETAL: + leg pain. No muscle pain. + back pain. No joint pain or stiffness.   HEMATOLOGIC: No anemia, bleeding or bruising.   LYMPHATICS: No enlarged nodes. No history of splenectomy.   PSYCHIATRIC: No history of depression, anxiety, hallucinations.   ENDOCRINOLOGIC: No reports of sweating, cold or heat intolerance. No polyuria or polydipsia.   Ext: No edema or  "bruising      The patient's old records including ambulatory rhythm recordings (ECGs, Holter/event monitor) were reviewed and discussed.           Objective:       Vitals:    08/03/18 1227   BP: 140/66   BP Location: Left arm   Patient Position: Sitting   Pulse: 55   SpO2: 98%   Weight: 73.9 kg (163 lb)   Height: 170.2 cm (67\")         Physical Exam:  Constitutional: Elderly male in no distress.   HEENT: Normocephalic. Atraumatic. Conjunctivae are normal. No scleral icterus. Mucus membranes pink and moist.  Neck: Normal carotid pulses. No JVD present. Carotid bruit is not present. No thyromegaly present.   Cardiovascular: RRR. S1 normal, S2 normal. No rubs, murmurs or gallops. PMI is not displaced. Pulses: Radial pulses are 2+ on the right side, and 2+ on the left side.    Dorsalis pedis pulses are 2+ on the right side, and 2+ on the left side.   Pulmonary: Non-labored. Clear to auscultation; no rales, wheezes or rhonchi.   Abdominal: Soft. Non tender. Non distended. Normoactive bowel sounds. No masses. No hepatosplenomegaly.   Musculoskeletal:  exhibits no edema, tenderness or deformity.   EXT: No swelling  Neurological: is alert and oriented to person, place, and time.   Skin: Skin is warm and dry. No rash noted. Non diaphoretic. No cyanosis or erythema. No pallor. Nails show no clubbing.   Psychiatric: Normal mood and affect.Speech is normal and behavior is normal.    Lab Review:   Results for orders placed or performed in visit on 02/12/18   Adult Transthoracic Echo Complete W/ Cont if Necessary Per Protocol   Result Value Ref Range    Target HR (85%) 116 bpm    Max. Pred. HR (100%) 136 bpm         ECG 12 Lead  Date/Time: 8/20/2018 5:51 PM  Performed by: MYAH REGAN  Authorized by: MYAH REGAN   Rhythm: sinus bradycardia  Conduction: right bundle branch block and 1st degree  Comments: HR 48 bpm   msec                Diagnosis:   1. Cardiomyopathy  2. NSVT  3. Syncope  4. CAD  5. HTN  Assessment & " Plan:   1. Cardiomyopathy, EF 40-45% in 2017, repeat echo 2/2018 with EF of 35-40%, on Altace since Feb and Coreg since April. NYHA class II symptoms. RBBB with QRS of 160-170 ms. Recheck echo to reeval EF.   2. NSVT: 4 beat run of NSVT captured on monitor 2/2018 but asymptomatic at the time. Reportedly had NSVT on telemetry during hospitalization in 2017 ([facility]) but difficult to tell if symptomatic at that time.   3. Syncope: two episodes of syncope with a prodrome of flushing/warm sensation coming over his body that occurred while standing. Denies other associated symptoms. Sounds like neurocardiogenic in nature to me. Last one was in Jan 2017, First one was about 3 yrs. Non Recently.   4. CAD, stable  5. HTN, stable    Plan:  --> Repeat echocardiogram  --> Long discussion with patient regarding risks and benefits of BiV ICD vs. BiV PPM pending echocardiogram results. I think at this time patient does not want a ICD and EF is 35-40% and not less than 35% so may be best to proceed with a BiV PPM if EF still on lower end and symptoms. He is fully agreeable and agrees no ICD for now. Consider BiV PPM in the near future for mixed CMP 35-40%, FC II symptoms and RBBB of 170 msec.   --> Continue optimal medical therapy with BB and ACE    I will call patient once Echo is completed to discuss further as noted above.            CC: Dr. Burch    Scribed for Guillermo Bynum DO by RAJ Douglas, APRN. 8/3/2018  1:05 PM     IGuillermo DO, personally performed the services described in this documentation as scribed by the above named individual in my presence, and it is both accurate and complete.  8/3/2018  1:21 PM    Guillermo Bynum DO  1:21 PM  08/03/18

## 2018-09-06 ENCOUNTER — HOSPITAL ENCOUNTER (OUTPATIENT)
Dept: CARDIOLOGY | Facility: HOSPITAL | Age: 83
Discharge: HOME | End: 2018-09-06
Admitting: INTERNAL MEDICINE

## 2018-09-06 ENCOUNTER — OUTSIDE FACILITY SERVICE (OUTPATIENT)
Dept: CARDIOLOGY | Facility: CLINIC | Age: 83
End: 2018-09-06

## 2018-09-06 LAB
MAXIMAL PREDICTED HEART RATE: 135 BPM
STRESS TARGET HR: 115 BPM

## 2018-09-06 PROCEDURE — 93306 TTE W/DOPPLER COMPLETE: CPT | Performed by: INTERNAL MEDICINE

## 2018-09-06 PROCEDURE — 93306 TTE W/DOPPLER COMPLETE: CPT

## 2018-10-19 RX ORDER — CARVEDILOL 3.12 MG/1
TABLET ORAL
Qty: 60 TABLET | Refills: 5 | Status: SHIPPED | OUTPATIENT
Start: 2018-10-19 | End: 2018-10-26 | Stop reason: SDUPTHER

## 2018-10-26 DIAGNOSIS — I10 ESSENTIAL HYPERTENSION: ICD-10-CM

## 2018-10-26 RX ORDER — RAMIPRIL 5 MG/1
5 CAPSULE ORAL DAILY
Qty: 90 CAPSULE | Refills: 2 | Status: SHIPPED | OUTPATIENT
Start: 2018-10-26 | End: 2018-11-26 | Stop reason: SDUPTHER

## 2018-10-26 RX ORDER — CARVEDILOL 3.12 MG/1
3.12 TABLET ORAL 2 TIMES DAILY
Qty: 180 TABLET | Refills: 2 | Status: SHIPPED | OUTPATIENT
Start: 2018-10-26 | End: 2020-02-11

## 2018-11-26 ENCOUNTER — OFFICE VISIT (OUTPATIENT)
Dept: CARDIOLOGY | Facility: CLINIC | Age: 83
End: 2018-11-26

## 2018-11-26 VITALS
WEIGHT: 167.4 LBS | HEART RATE: 54 BPM | SYSTOLIC BLOOD PRESSURE: 142 MMHG | OXYGEN SATURATION: 99 % | DIASTOLIC BLOOD PRESSURE: 77 MMHG | HEIGHT: 67 IN | BODY MASS INDEX: 26.27 KG/M2

## 2018-11-26 DIAGNOSIS — I25.10 CORONARY ARTERY DISEASE INVOLVING NATIVE CORONARY ARTERY OF NATIVE HEART WITHOUT ANGINA PECTORIS: Primary | ICD-10-CM

## 2018-11-26 DIAGNOSIS — I10 ESSENTIAL HYPERTENSION: ICD-10-CM

## 2018-11-26 DIAGNOSIS — I25.5 ISCHEMIC CARDIOMYOPATHY: ICD-10-CM

## 2018-11-26 DIAGNOSIS — R06.02 SHORTNESS OF BREATH: ICD-10-CM

## 2018-11-26 PROCEDURE — 99213 OFFICE O/P EST LOW 20 MIN: CPT | Performed by: PHYSICIAN ASSISTANT

## 2018-11-26 RX ORDER — RAMIPRIL 5 MG/1
5 CAPSULE ORAL DAILY
Qty: 90 CAPSULE | Refills: 3 | Status: SHIPPED | OUTPATIENT
Start: 2018-11-26 | End: 2019-12-11 | Stop reason: SDUPTHER

## 2018-11-26 RX ORDER — TAMSULOSIN HYDROCHLORIDE 0.4 MG/1
0.4 CAPSULE ORAL DAILY
COMMUNITY
Start: 2018-10-01 | End: 2021-09-29

## 2018-11-26 NOTE — PATIENT INSTRUCTIONS

## 2018-11-26 NOTE — PROGRESS NOTES
Problem list     Subjective   Israel Toure is a 85 y.o. male     Chief Complaint   Patient presents with   • Follow-up     patient appears in office today for follow up test results    • Coronary Artery Disease   Problem List:  1.  Coronary artery disease.  1.1.  MI, 1990s, inadequate data.  The patient apparently had catheterization with no follow-up mechanical intervention.  1.2.  Repeat catheterization, 2017, in the setting of unstable angina.  The patient had an occluded right coronary artery with adequate collateral flow.  He had nonobstructive disease in the LAD and circumflex.  Medical management was recommended.  2.  Ischemic cardiomyopathy, estimated EF at 30-35%.  3.  Hypertension  4.  Dyslipidemia  5.  Chronic palpitations with questionable diagnosis of atrial fibrillation, inadequate data.    HPI    The patient presents in for routine evaluation follow-up.  He did have an echocardiogram several weeks ago.  That was ordered through EP services to reevaluate systolic function.  He had been recommended, pending echo results, to at least consider ICD therapy.  The patient did not want that.  His follow-up echo suggested an EF at about 30%.  Findings were stable otherwise for this treatment.  He tells me that since starting carvedilol therapy, he has felt much improved.  His symptoms of palpitations even seem less.  He seems to have more energy.  He reports seemingly less dyspnea.  Currently, he has no chest pain.  He has no PND orthopnea.  He reports largely stable blood pressures despite not having random approachable for the last 2 weeks.  He requests refills of that.  Otherwise, he has no further complaints.  Current Outpatient Medications   Medication Sig Dispense Refill   • ALPRAZolam (XANAX) 1 MG tablet Take 2 mg by mouth At Night As Needed for Anxiety.     • aspirin 81 MG EC tablet Take 81 mg by mouth Daily.     • carvedilol (COREG) 3.125 MG tablet Take 1 tablet by mouth 2 (Two) Times a Day. 180  tablet 2   • doxazosin (CARDURA) 8 MG tablet Take 4 mg by mouth Every Night.     • ramipril (ALTACE) 5 MG capsule Take 1 capsule by mouth Daily. 90 capsule 3   • simvastatin (ZOCOR) 80 MG tablet Take 40 mg by mouth Every Night.     • tamsulosin (FLOMAX) 0.4 MG capsule 24 hr capsule Take 0.4 mg by mouth Daily.     • traMADol (ULTRAM) 50 MG tablet Take 50 mg by mouth As Needed for Moderate Pain .       No current facility-administered medications for this visit.        Patient has no known allergies.    Past Medical History:   Diagnosis Date   • Acute MI (CMS/HCC)    • Cancer (CMS/HCC)     melanoma   • COPD (chronic obstructive pulmonary disease) (CMS/HCC)    • Diverticulosis    • Hypertension    • Hypomagnesemia    • Osteoarthritis    • Prostatitis    • Spinal stenosis        Social History     Socioeconomic History   • Marital status:      Spouse name: Not on file   • Number of children: Not on file   • Years of education: Not on file   • Highest education level: Not on file   Social Needs   • Financial resource strain: Not on file   • Food insecurity - worry: Not on file   • Food insecurity - inability: Not on file   • Transportation needs - medical: Not on file   • Transportation needs - non-medical: Not on file   Occupational History   • Not on file   Tobacco Use   • Smoking status: Former Smoker     Types: Cigarettes     Last attempt to quit: 1990     Years since quittin.9   • Smokeless tobacco: Never Used   Substance and Sexual Activity   • Alcohol use: No   • Drug use: No   • Sexual activity: Defer   Other Topics Concern   • Not on file   Social History Narrative   • Not on file       Family History   Problem Relation Age of Onset   • No Known Problems Mother    • No Known Problems Father        Review of Systems   Constitutional: Negative.  Negative for fatigue.   HENT: Negative.  Negative for congestion, rhinorrhea, sneezing and sore throat.    Eyes: Negative.  Negative for visual disturbance.  "  Respiratory: Negative.  Negative for apnea, cough, chest tightness, shortness of breath (SOA on exertion only) and wheezing.    Cardiovascular: Positive for leg swelling (BLE swelling/edema on occasion). Negative for chest pain (denies CP) and palpitations (denies palpitations).   Gastrointestinal: Negative.  Negative for abdominal distention, abdominal pain, nausea and vomiting.   Endocrine: Negative.  Negative for cold intolerance and heat intolerance.   Genitourinary: Negative.  Negative for difficulty urinating, frequency and urgency.   Musculoskeletal: Positive for arthralgias (joints) and back pain (back pain from arthritis). Negative for myalgias, neck pain and neck stiffness.   Skin: Negative.  Negative for rash and wound.   Allergic/Immunologic: Negative.  Negative for environmental allergies and food allergies.   Neurological: Negative.  Negative for dizziness, syncope, weakness, light-headedness and headaches.   Hematological: Bruises/bleeds easily (bruises and bleeds easily).   Psychiatric/Behavioral: Negative for agitation, confusion and sleep disturbance (denies waking up smothering/SOA). The patient is nervous/anxious (easily nervous/anxious).        Objective   Vitals:    11/26/18 1513   BP: 142/77   BP Location: Left arm   Patient Position: Sitting   Pulse: 54   SpO2: 99%   Weight: 75.9 kg (167 lb 6.4 oz)   Height: 170.2 cm (67\")      /77 (BP Location: Left arm, Patient Position: Sitting)   Pulse 54   Ht 170.2 cm (67\")   Wt 75.9 kg (167 lb 6.4 oz)   SpO2 99%   BMI 26.22 kg/m²    Lab Results (most recent)     None        Physical Exam   Constitutional: He is oriented to person, place, and time. He appears well-developed and well-nourished. No distress.   HENT:   Head: Normocephalic and atraumatic.   Eyes: Conjunctivae, EOM and lids are normal. Pupils are equal, round, and reactive to light. Lids are everted and swept, no foreign bodies found.   Neck: Normal range of motion. Neck supple. " Normal carotid pulses, no hepatojugular reflux and no JVD present. Carotid bruit is not present. No thyroid mass and no thyromegaly present.   Cardiovascular: S1 normal, S2 normal, normal heart sounds, intact distal pulses and normal pulses.  No extrasystoles are present. Exam reveals no gallop, no S3, no S4, no distant heart sounds, no friction rub and no decreased pulses.   No murmur heard.  Pulses:       Radial pulses are 2+ on the right side, and 2+ on the left side.        Dorsalis pedis pulses are 2+ on the right side, and 2+ on the left side.        Posterior tibial pulses are 2+ on the right side, and 2+ on the left side.   Pulmonary/Chest: Effort normal and breath sounds normal. No respiratory distress. He has no decreased breath sounds. He has no wheezes. He has no rhonchi. He has no rales. He exhibits no tenderness.   Abdominal: Soft. Bowel sounds are normal. He exhibits no distension, no abdominal bruit and no mass. There is no tenderness.   Negative organomegaly      Musculoskeletal: Normal range of motion. He exhibits edema (trace edema BLE). He exhibits no tenderness or deformity.   Lymphadenopathy:     He has no cervical adenopathy.     He has no axillary adenopathy.   Neurological: He is alert and oriented to person, place, and time. He has normal reflexes.   Skin: Skin is warm and dry. No rash noted. He is not diaphoretic. No erythema. No pallor.   Psychiatric: He has a normal mood and affect. His speech is normal and behavior is normal. Judgment and thought content normal. Cognition and memory are normal.   Nursing note and vitals reviewed.        Procedure   Procedures       Assessment/Plan      Diagnosis Plan   1. Coronary artery disease involving native coronary artery of native heart without angina pectoris     2. Essential hypertension  ramipril (ALTACE) 5 MG capsule   3. Ischemic cardiomyopathy     4. Shortness of breath         I did give the patient refills for ramipril.  We reviewed echo  findings, as outlined above.  He has seen EP services for consideration for ICD and would not want that at this time, acknowledging risk without doing so.  From general cardiovascular standpoint, the patient denies symptoms of angina, failure, or dysrhythmias.  I feel nothing further is indicated.  We will continue medications.  For any complications, he will call.  Otherwise, we can see him back in 6 months, sooner for complications.           Patient's Body mass index is 26.22 kg/m². BMI is above normal parameters. Recommendations include: educational material and referral to primary care.             Electronically signed by:

## 2019-01-04 ENCOUNTER — OFFICE VISIT (OUTPATIENT)
Dept: CARDIOLOGY | Facility: CLINIC | Age: 84
End: 2019-01-04

## 2019-01-04 VITALS
HEART RATE: 60 BPM | HEIGHT: 67 IN | BODY MASS INDEX: 25.58 KG/M2 | WEIGHT: 163 LBS | SYSTOLIC BLOOD PRESSURE: 124 MMHG | DIASTOLIC BLOOD PRESSURE: 50 MMHG

## 2019-01-04 DIAGNOSIS — I25.10 CORONARY ARTERY DISEASE INVOLVING NATIVE CORONARY ARTERY OF NATIVE HEART WITHOUT ANGINA PECTORIS: ICD-10-CM

## 2019-01-04 DIAGNOSIS — R55 SYNCOPE, UNSPECIFIED SYNCOPE TYPE: ICD-10-CM

## 2019-01-04 DIAGNOSIS — I47.29 NSVT (NONSUSTAINED VENTRICULAR TACHYCARDIA) (HCC): Primary | ICD-10-CM

## 2019-01-04 DIAGNOSIS — R00.1 BRADYCARDIA: ICD-10-CM

## 2019-01-04 DIAGNOSIS — I25.5 ISCHEMIC CARDIOMYOPATHY: ICD-10-CM

## 2019-01-04 PROCEDURE — 99214 OFFICE O/P EST MOD 30 MIN: CPT | Performed by: INTERNAL MEDICINE

## 2019-01-04 NOTE — PROGRESS NOTES
Israel Toure  2/26/1933    There is no work phone number on file.    1/4/2019    [facility] CARDIOLOGY    Braulio Heck MD  [address] [place] [address]    REFERRING DOCTOR: Dr. Burch      Patient ID: Israel Toure is a 85 y.o. male.    Chief Complaint: Cardiomyopathy  Problem List:  1.  Coronary artery disease.   1.1.  MI, 1990s, inadequate data.  The patient apparently had catheterization with no follow-up mechanical intervention.   1.2.  Repeat catheterization, 2017, in the setting of unstable angina.  The patient had an occluded right coronary artery with adequate collateral flow.  He had nonobstructive disease in the LAD and circumflex.  Medical management was recommended.  2.  Ischemic cardiomyopathy   2.1 Echocardiogram ~2017 EF 40-45%, Dr. Peck at [facility]- data deficit   2.2 Echocardiogram 2/21/18: EF 35-40%, grade II diastolic dysfunction, LA moderately dilated, RA mildly dilated, mild MR, mild TR, mild DC   2.3 Holter monitor 2/2018: 4 beat run of NSVT, asymptomatic   2.4 RBBB, -160 ms              2.5 Echocardiogram September 2018.  Ejection fraction 25-30%.  LA mildly dilated.  Mild aortic stenosis.  Mild to moderate mitral regurgitation.  3.  Hypertension  4.  Dyslipidemia  5.  Chronic palpitations with questionable diagnosis of atrial fibrillation, inadequate data.    Allergies:   No Known Allergies    Current Medications:    Current Outpatient Medications:   •  ALPRAZolam (XANAX) 1 MG tablet, Take 2 mg by mouth At Night As Needed for Anxiety., Disp: , Rfl:   •  aspirin 81 MG EC tablet, Take 81 mg by mouth Daily., Disp: , Rfl:   •  carvedilol (COREG) 3.125 MG tablet, Take 1 tablet by mouth 2 (Two) Times a Day., Disp: 180 tablet, Rfl: 2  •  doxazosin (CARDURA) 8 MG tablet, Take 4 mg by mouth Every Night., Disp: , Rfl:   •  ramipril (ALTACE) 5 MG capsule, Take 1 capsule by mouth Daily., Disp: 90 capsule, Rfl: 3  •  simvastatin (ZOCOR) 80 MG  tablet, Take 40 mg by mouth Every Night., Disp: , Rfl:   •  tamsulosin (FLOMAX) 0.4 MG capsule 24 hr capsule, Take 0.4 mg by mouth Daily., Disp: , Rfl:   •  traMADol (ULTRAM) 50 MG tablet, Take 50 mg by mouth As Needed for Moderate Pain ., Disp: , Rfl:     History of Present Illness: Ms. Toure is an 84 y/o male with the above noted pmhx who is seen today for follow up for cardiomyopathy. History as follows: He has had a reduced EF of 40-45% in 2017 although we do not have copy of this. He had repeat echocardiogram on 2/21/18 with an EF of 35-40%. He has been on optimal medical therapy with  Altace for greater than 90 days (started Feb. 2018) and on Coreg since April 2018. This was initially not started s/t bradycardia and hypotension at times. He wore Holter monitor in February which demonstrated 4 beat run of NSVT, asymptotic at the time but no other arrhythmia noted. EKG demonstrates chronic RBBB with QRS of 150-160 ms. He has had two episodes of syncope. One was about three years ago and the second was 1.5 years ago. Prior to onset he felt a warm sensation come over his body. No palpitations prior to onset or other symptoms. With one episode he was evaluated at [facility] and noted to have some VT on telemetry but it is unclear if this corresponded to his symptoms. He is pretty active. He is able to do his yard work and household chores. His daughter states that he does get tired and has to take breaks. He does not feel like he has reduced energy level. He denies SOP, CP, PND and orthopnea.     Both episodes of syncope seem to be consistent with neurocardiogenic with prodrome of LH and a feeling of doom with no sig palpitations. Also dealing with LE pain and taking pain pills he feels better. NSVT on monitor with no symptoms of up to 4 beats.     Echocardiogram done in September showed ejection fraction of 25-30 percent.  On previous visit he has decided against an ICD and is here today for  "follow-up visit as noted above.  He is to maintain on optimal medical therapy as noted that he can tolerate. Biggest issue is still dealing with some fatigue and alittle less energy. Also, dealing with some SOB , no sig CP noted.   FC III symptoms noted.     The following portions of the patient's history were reviewed and updated as appropriate: allergies, current medications, past family history, past medical history, past social history, past surgical history and problem list.      Past Medical History:   Diagnosis Date   • Acute MI (CMS/HCC)    • Cancer (CMS/HCC)     melanoma   • COPD (chronic obstructive pulmonary disease) (CMS/HCC)    • Diverticulosis    • Hypertension    • Hypomagnesemia    • Osteoarthritis    • Prostatitis    • Spinal stenosis          The patient's old records including ambulatory rhythm recordings (ECGs, Holter/event monitor) were reviewed and discussed.           Objective:       Vitals:    01/04/19 1247   BP: 124/50   BP Location: Left arm   Patient Position: Sitting   Pulse: 60   Weight: 73.9 kg (163 lb)   Height: 170.2 cm (67\")         Physical Exam:  Constitutional: Elderly male in no distress.   HEENT: Normocephalic. Atraumatic. Conjunctivae are normal. No scleral icterus. Mucus membranes pink and moist.  Neck: Normal carotid pulses. No JVD present. Carotid bruit is not present. No thyromegaly present.   Cardiovascular: RRR. S1 normal, S2 normal. No rubs, murmurs or gallops. PMI is not displaced. Pulses: Radial pulses are 2+ on the right side, and 2+ on the left side.    Dorsalis pedis pulses are 2+ on the right side, and 2+ on the left side.   Pulmonary: Non-labored. Clear to auscultation; no rales, wheezes or rhonchi.   Abdominal: Soft. Non tender. Non distended. Normoactive bowel sounds. No masses. No hepatosplenomegaly.   Musculoskeletal:  exhibits no edema, tenderness or deformity.   EXT: No swelling  Neurological: is alert and oriented to person, place, and time.   Skin: Skin " is warm and dry. No rash noted. Non diaphoretic. No cyanosis or erythema. No pallor. Nails show no clubbing.   Psychiatric: Normal mood and affect.Speech is normal and behavior is normal.    Lab Review:   Results for orders placed or performed in visit on 08/03/18   Adult Transthoracic Echo Complete W/ Cont if Necessary Per Protocol   Result Value Ref Range    Target HR (85%) 115 bpm    Max. Pred. HR (100%) 135 bpm       Procedures        Diagnosis:   1. Cardiomyopathy  2. NSVT  3. Syncope  4. CAD  5. HTN  Assessment & Plan:   1. Cardiomyopathy, EF 40-45% in 2017, repeat echo 2/2018 with EF of 35-40% and most recently 25-30% in Sept 2018. NYHA class III symptoms. RBBB with QRS of 160-170 ms, LAFB.  2. NSVT: 4 beat run of NSVT captured on monitor 2/2018 but asymptomatic at the time. Reportedly had NSVT on telemetry during hospitalization in 2017 ([facility]) but difficult to tell if symptomatic at that time.   3. Syncope: two episodes of syncope with a prodrome of flushing/warm sensation coming over his body that occurred while standing. Denies other associated symptoms. Sounds like neurocardiogenic in nature to me. Last one was in Jan 2017, First one was about 3 yrs. Non Recently.   4. CAD, stable  5. HTN, stable    Plan:  --> Long discussion with patient regarding risks and benefits of BiV ICD vs. BiV PPM. I think at this time patient does not want a ICD as mentioned. He will call us next week to let us know what he has decided. I think one of the other devices would be best for him since his symptoms are worsening and would be leaning towards a BiV PPM to help his symptoms.   --> Continue optimal medical therapy with BB and ACE    Will follow up after discussion with patient next week. If no device will follow up in a few months.          Guillermo Bynum,   1:40 PM  01/04/19

## 2019-05-03 ENCOUNTER — OFFICE VISIT (OUTPATIENT)
Dept: CARDIOLOGY | Facility: CLINIC | Age: 84
End: 2019-05-03

## 2019-05-03 VITALS
OXYGEN SATURATION: 99 % | HEIGHT: 67 IN | SYSTOLIC BLOOD PRESSURE: 150 MMHG | DIASTOLIC BLOOD PRESSURE: 82 MMHG | WEIGHT: 163 LBS | HEART RATE: 52 BPM | BODY MASS INDEX: 25.58 KG/M2

## 2019-05-03 DIAGNOSIS — I25.10 CORONARY ARTERY DISEASE INVOLVING NATIVE CORONARY ARTERY OF NATIVE HEART WITHOUT ANGINA PECTORIS: ICD-10-CM

## 2019-05-03 DIAGNOSIS — R55 VASOVAGAL SYNCOPE: ICD-10-CM

## 2019-05-03 DIAGNOSIS — I25.5 ISCHEMIC CARDIOMYOPATHY: Primary | ICD-10-CM

## 2019-05-03 DIAGNOSIS — I50.22 CHRONIC SYSTOLIC CHF (CONGESTIVE HEART FAILURE), NYHA CLASS 3 (HCC): ICD-10-CM

## 2019-05-03 PROCEDURE — 99213 OFFICE O/P EST LOW 20 MIN: CPT | Performed by: PHYSICIAN ASSISTANT

## 2019-05-03 NOTE — PROGRESS NOTES
[facility]   OFFICE NOTE      Israel Toure  2/26/1933  PCP: Braulio Heck MD    SUBJECTIVE:   Israel Toure is a 86 y.o. male seen for a follow up visit regarding the following:     CC:ICM    Problem List:  1.  Coronary artery disease.              1.1.  MI, 1990s, inadequate data.  The patient apparently had catheterization with no follow-up mechanical intervention.              1.2.  Repeat catheterization, 2017, in the setting of unstable angina.  The patient had an occluded right coronary artery with adequate collateral flow.  He had nonobstructive disease in the LAD and circumflex.  Medical management was recommended.  2.  Ischemic cardiomyopathy              2.1 Echocardiogram ~2017 EF 40-45%, Dr. Peck at [facility]- data deficit              2.2 Echocardiogram 2/21/18: EF 35-40%, grade II diastolic dysfunction, LA moderately dilated, RA mildly dilated, mild MR, mild TR, mild RI              2.3 Holter monitor 2/2018: 4 beat run of NSVT, asymptomatic              2.4 RBBB, -160 ms              2.5 Echocardiogram September 2018.  Ejection fraction 25-30%.  LA mildly dilated.  Mild aortic stenosis.  Mild to moderate mitral regurgitation.  3.  Hypertension  4.  Dyslipidemia  5.  Chronic palpitations with questionable diagnosis of atrial fibrillation, inadequate data.  6. PVD     HPI:   Mr. Toure returns for follow-up for his ischemic cardiomyopathy, NSVT, CAD, hypertension, and syncope.  He was last seen in January with Dr. Nevarez.  At that visit, Dr. Bynum discussed BiV pacemaker implant improvement of his heart failure symptoms and EF.  The patient wanted to think about this and has not pursued as of yet.  Since then, he thinks is doing a little better. He states that he works in his yard for about 2-3 hours and then has to rest. He states that he also has a lot of back pain. No chest pain or syncopal episodes. No recent hospitalizations or ER  visits. He has mild swelling in his right ankle which does not bother him. No issues with PND or orthopnea. No bleeding issues on ASA. No CVA like symptoms.         ROS:  Review of Symptoms:  General: no recent weight loss/gain, weakness + fatigue  Skin: no rashes, lumps, or other skin changes  HEENT: no dizziness, lightheadedness, or vision changes  Respiratory: no cough or hemoptysis  Cardiovascular: no palpitations, and tachycardia  Gastrointestinal: no black/tarry stools or diarrhea  Urinary: no change in frequency or urgency + urinary retention.   Peripheral Vascular: no claudication + leg cramps  Musculoskeletal: no muscle or joint pain/stiffness  Psychiatric: no depression or excessive stress  Neurological: no sensory or motor loss, no syncope  Hematologic: no anemia, easy bruising or bleeding  Endocrine: no thyroid problems, nor heat or cold intolerance    Cardiac PMH: (Old records have been reviewed and summarized below)      Past Medical History, Past Surgical History, Family history, Social History, and Medications were all reviewed with the patient today and updated as necessary.       Current Outpatient Medications:   •  ALPRAZolam (XANAX) 1 MG tablet, Take 2 mg by mouth At Night As Needed for Anxiety., Disp: , Rfl:   •  aspirin 81 MG EC tablet, Take 81 mg by mouth Daily., Disp: , Rfl:   •  carvedilol (COREG) 3.125 MG tablet, Take 1 tablet by mouth 2 (Two) Times a Day., Disp: 180 tablet, Rfl: 2  •  doxazosin (CARDURA) 8 MG tablet, Take 4 mg by mouth Every Night., Disp: , Rfl:   •  ramipril (ALTACE) 5 MG capsule, Take 1 capsule by mouth Daily., Disp: 90 capsule, Rfl: 3  •  simvastatin (ZOCOR) 80 MG tablet, Take 40 mg by mouth Every Night., Disp: , Rfl:   •  tamsulosin (FLOMAX) 0.4 MG capsule 24 hr capsule, Take 0.4 mg by mouth Daily., Disp: , Rfl:   •  traMADol (ULTRAM) 50 MG tablet, Take 50 mg by mouth As Needed for Moderate Pain ., Disp: , Rfl:       No Known Allergies  Patient Active Problem List  "  Diagnosis   • Bradycardia   • Coronary artery disease involving native coronary artery of native heart without angina pectoris   • Essential hypertension   • Syncope   • Constipation   • Cold feeling   • Anisocoria   • NSVT (nonsustained ventricular tachycardia) (CMS/HCC)   • Cardiomyopathy (CMS/HCC)     Past Medical History:   Diagnosis Date   • Acute MI (CMS/HCC)    • Cancer (CMS/HCC)     melanoma   • COPD (chronic obstructive pulmonary disease) (CMS/HCC)    • Diverticulosis    • Hypertension    • Hypomagnesemia    • Osteoarthritis    • Prostatitis    • Spinal stenosis      Past Surgical History:   Procedure Laterality Date   • CARDIAC CATHETERIZATION     • CATARACT EXTRACTION     • HERNIA REPAIR     • SKIN CANCER EXCISION       Family History   Problem Relation Age of Onset   • No Known Problems Mother    • No Known Problems Father      Social History     Tobacco Use   • Smoking status: Former Smoker     Types: Cigarettes     Last attempt to quit: 1990     Years since quittin.3   • Smokeless tobacco: Never Used   Substance Use Topics   • Alcohol use: No         PHYSICAL EXAM:    /82 (BP Location: Left arm, Patient Position: Sitting)   Pulse 52   Ht 170.2 cm (67\")   Wt 73.9 kg (163 lb)   SpO2 99%   BMI 25.53 kg/m²        Wt Readings from Last 5 Encounters:   19 73.9 kg (163 lb)   19 73.9 kg (163 lb)   18 75.9 kg (167 lb 6.4 oz)   18 73.9 kg (163 lb)   04/10/18 78.6 kg (173 lb 3.2 oz)       BP Readings from Last 5 Encounters:   19 150/82   19 124/50   18 142/77   18 140/66   04/10/18 148/68       General appearance - Alert, well appearing, and in no distress   Mental status - Affect appropriate to mood.  Eyes - Sclerae anicteric,  ENMT - Hearing grossly normal bilaterally, Dental hygiene good.  Neck - Carotids upstroke normal bilaterally, no bruits, no JVD.  Resp - Clear to auscultation, no wheezes, rales or rhonchi, symmetric air entry.  Heart - " Normal rate, regular rhythm, normal S1, S2, no murmurs, rubs, clicks or gallops.  GI - Soft, nontender, nondistended, no masses or organomegaly.  Neurological - Grossly intact - normal speech, no focal findings  Musculoskeletal - No joint tenderness, deformity or swelling, no muscular tenderness noted.  Extremities - Peripheral pulses normal, no pedal edema, no clubbing or cyanosis.  Skin - Normal coloration and turgor.  Psych -  oriented to person, place, and time.    Medical problems and test results were reviewed with the patient today.     No results found for this or any previous visit (from the past 672 hour(s)).      ASSESSMENT/PLAN:   1. ICM:  Last EF 9/18 EF 25%.  At his last appointment with Dr. Bynum in January 2019, it was discussed with the patient that a BIV pacemaker would be a good option for improving his EF and heart failure symptoms. I again had a long discussion with him on BiV PM today including its benefits, risks, and potential complications. He would like to thinks about it for now as he is doing well overall.   2. Chronic class III SHF -Coreg and Altace.   3. CAD:  No Angina, on asa  4. HLD: Zocor, follow up PCP FL[facility]  5. HT: Controlled on Coreg, Cardura and Altace  6. RBBB-QRS of 160 to 170 ms on previous EKGs  7. Vasovagal syncope events-none recently      Follow up with Dr. Bae in [place] in 6 months.     Electronically signed by ELVIN Rangel, 05/03/19, 10:22 AM.

## 2019-08-27 ENCOUNTER — OFFICE VISIT (OUTPATIENT)
Dept: CARDIOLOGY | Facility: CLINIC | Age: 84
End: 2019-08-27

## 2019-08-27 VITALS
SYSTOLIC BLOOD PRESSURE: 137 MMHG | DIASTOLIC BLOOD PRESSURE: 57 MMHG | HEIGHT: 67 IN | WEIGHT: 163.4 LBS | OXYGEN SATURATION: 98 % | BODY MASS INDEX: 25.65 KG/M2 | HEART RATE: 54 BPM

## 2019-08-27 DIAGNOSIS — I25.10 CORONARY ARTERY DISEASE INVOLVING NATIVE CORONARY ARTERY OF NATIVE HEART WITHOUT ANGINA PECTORIS: Primary | ICD-10-CM

## 2019-08-27 DIAGNOSIS — I25.5 ISCHEMIC CARDIOMYOPATHY: ICD-10-CM

## 2019-08-27 DIAGNOSIS — I10 ESSENTIAL HYPERTENSION: ICD-10-CM

## 2019-08-27 PROCEDURE — 93000 ELECTROCARDIOGRAM COMPLETE: CPT | Performed by: PHYSICIAN ASSISTANT

## 2019-08-27 PROCEDURE — 99213 OFFICE O/P EST LOW 20 MIN: CPT | Performed by: PHYSICIAN ASSISTANT

## 2019-08-27 NOTE — PROGRESS NOTES
Problem list     Subjective   Israel Toure is a 86 y.o. male     Chief Complaint   Patient presents with   • Coronary Artery Disease   Problem List:  1.  Coronary artery disease.  1.1.  MI, 1990s, inadequate data.  The patient apparently had catheterization with no follow-up mechanical intervention.  1.2.  Repeat catheterization, 2017, in the setting of unstable angina.  The patient had an occluded right coronary artery with adequate collateral flow.  He had nonobstructive disease in the LAD and circumflex.  Medical management was recommended.  2.  Ischemic cardiomyopathy, estimated EF at 30-35%.  3.  Hypertension  4.  Dyslipidemia  5.  Chronic palpitations with questionable diagnosis of atrial fibrillation, inadequate data.    HPI  The patient presents in today for routine evaluation follow-up.  Since last evaluation, he has remained stable.  He did see EP services who again recommended consideration for ICD implant given evidence of severe dilated cardiomyopathy.  The patient again did not want to pursue the same.  He feels well enough that he does not want an ICD.  Clinically, he continues to do well.  He has no chest pain.  He has stable dyspnea.  He has no PND orthopnea.  He has no dysrhythmic symptoms.  He is tolerating current medication without complication.  He has no further complaints otherwise and feels he is doing very well.    Current Outpatient Medications   Medication Sig Dispense Refill   • ALPRAZolam (XANAX) 1 MG tablet Take 2 mg by mouth At Night As Needed for Anxiety.     • aspirin 81 MG EC tablet Take 81 mg by mouth Daily.     • carvedilol (COREG) 3.125 MG tablet Take 1 tablet by mouth 2 (Two) Times a Day. 180 tablet 2   • doxazosin (CARDURA) 8 MG tablet Take 4 mg by mouth Every Night.     • ramipril (ALTACE) 5 MG capsule Take 1 capsule by mouth Daily. 90 capsule 3   • simvastatin (ZOCOR) 80 MG tablet Take 40 mg by mouth Every Night.     • tamsulosin (FLOMAX) 0.4 MG capsule 24 hr capsule  Take 0.4 mg by mouth Daily.     • traMADol (ULTRAM) 50 MG tablet Take 50 mg by mouth As Needed for Moderate Pain .       No current facility-administered medications for this visit.        Patient has no known allergies.    Past Medical History:   Diagnosis Date   • Acute MI (CMS/HCC)    • Cancer (CMS/HCC)     melanoma   • COPD (chronic obstructive pulmonary disease) (CMS/HCC)    • Diverticulosis    • Hypertension    • Hypomagnesemia    • Osteoarthritis    • Prostatitis    • Spinal stenosis        Social History     Socioeconomic History   • Marital status:      Spouse name: Not on file   • Number of children: Not on file   • Years of education: Not on file   • Highest education level: Not on file   Tobacco Use   • Smoking status: Former Smoker     Types: Cigarettes     Last attempt to quit:      Years since quittin.6   • Smokeless tobacco: Never Used   Substance and Sexual Activity   • Alcohol use: No   • Drug use: No   • Sexual activity: Defer       Family History   Problem Relation Age of Onset   • No Known Problems Mother    • No Known Problems Father        Review of Systems   Constitutional: Negative.  Negative for fatigue.   HENT: Positive for hearing loss (B hearing loss) and rhinorrhea (runny nose from allergies). Negative for congestion and sneezing.    Eyes: Negative.  Negative for visual disturbance.   Respiratory: Negative.  Negative for cough, chest tightness, shortness of breath and wheezing.    Cardiovascular: Positive for leg swelling (occasional BLE swelling/edema with exertion). Negative for chest pain and palpitations.   Gastrointestinal: Negative.  Negative for abdominal pain, nausea and vomiting.   Endocrine: Negative.  Negative for cold intolerance and heat intolerance.   Genitourinary: Negative.  Negative for difficulty urinating, frequency and urgency.   Musculoskeletal: Positive for arthralgias (joints) and back pain (back pain from arthritis). Negative for neck pain and  "neck stiffness.   Skin: Negative.  Negative for rash and wound.   Allergic/Immunologic: Negative.  Negative for environmental allergies and food allergies.   Neurological: Negative.  Negative for dizziness, syncope, weakness, light-headedness and headaches.   Hematological: Bruises/bleeds easily (bruises and bleeds easily).   Psychiatric/Behavioral: Negative for agitation, confusion and sleep disturbance (denies waking up smothering/SOA). The patient is nervous/anxious (easily nervous/anxious).        Objective   Vitals:    08/27/19 1306   BP: 137/57   BP Location: Left arm   Patient Position: Sitting   Pulse: 54   SpO2: 98%   Weight: 74.1 kg (163 lb 6.4 oz)   Height: 170.2 cm (67\")      /57 (BP Location: Left arm, Patient Position: Sitting)   Pulse 54   Ht 170.2 cm (67\")   Wt 74.1 kg (163 lb 6.4 oz)   SpO2 98%   BMI 25.59 kg/m²    Lab Results (most recent)     None        Physical Exam   Constitutional: He is oriented to person, place, and time. He appears well-developed and well-nourished. No distress.   HENT:   Head: Normocephalic and atraumatic.   Eyes: Conjunctivae, EOM and lids are normal. Pupils are equal, round, and reactive to light. Lids are everted and swept, no foreign bodies found.   Neck: Normal range of motion. Neck supple. Normal carotid pulses, no hepatojugular reflux and no JVD present. Carotid bruit is not present. No thyroid mass and no thyromegaly present.   Cardiovascular: S1 normal, S2 normal, normal heart sounds, intact distal pulses and normal pulses.  No extrasystoles are present. Exam reveals no gallop, no S3, no S4, no distant heart sounds, no friction rub and no decreased pulses.   No murmur heard.  Pulses:       Radial pulses are 2+ on the right side, and 2+ on the left side.        Dorsalis pedis pulses are 2+ on the right side, and 2+ on the left side.        Posterior tibial pulses are 2+ on the right side, and 2+ on the left side.   Pulmonary/Chest: Effort normal and " breath sounds normal. No respiratory distress. He has no decreased breath sounds. He has no wheezes. He has no rhonchi. He has no rales. He exhibits no tenderness.   Abdominal: Soft. Bowel sounds are normal. He exhibits no distension, no abdominal bruit and no mass. There is no tenderness.   Negative organomegaly      Musculoskeletal: Normal range of motion. He exhibits edema (trace edema BLE). He exhibits no tenderness or deformity.   Lymphadenopathy:     He has no cervical adenopathy.     He has no axillary adenopathy.   Neurological: He is alert and oriented to person, place, and time. He has normal reflexes.   Skin: Skin is warm and dry. No rash noted. He is not diaphoretic. No erythema. No pallor.   Psychiatric: He has a normal mood and affect. His speech is normal and behavior is normal. Judgment and thought content normal. Cognition and memory are normal.   Nursing note and vitals reviewed.        Procedure     ECG 12 Lead  Date/Time: 8/27/2019 1:18 PM  Performed by: Moisés Palacios PA  Authorized by: Moisés Palacios PA   Comparison: not compared with previous ECG   Comments: HTN    Sinus bradycardia at 48, first-degree AV block, right bundle branch block morphology, left anterior fascicular block, no acute changes noted.               Assessment/Plan      Diagnosis Plan   1. Coronary artery disease involving native coronary artery of native heart without angina pectoris     2. Essential hypertension  ECG 12 Lead   3. Ischemic cardiomyopathy       1.  At this time, the patient feels that he is doing well.  He has been recommended to have consideration for ICD therapy given his marked cardiomyopathy.  He does not want to proceed with any consideration for ICD therapy.  Should he change his mind on that, he will contact us or EP services.  I feel that he would benefit from that obviously because of his cardiomyopathy, but also because of his diffuse baseline conduction system disease.    2.  Otherwise, the  patient is on appropriate medications.  I will continue that therapy without change.  I would not increase beta-blocker therapy again with his diffuse conduction system disease.    3.  The patient is very much knowledgeable in terms of symptoms to suggest decompensation of cardiomyopathy.  For any complications that he will call immediately.  We will continue to see the patient every 6 months to the clinic.  I would like to repeat an echocardiogram to reevaluate systolic function.  We can recommend and reevaluate at that time pending results.           Patient's Body mass index is 25.59 kg/m². BMI is above normal parameters. Recommendations include: educational material and referral to primary care.             Electronically signed by:

## 2019-08-27 NOTE — PATIENT INSTRUCTIONS

## 2019-09-11 ENCOUNTER — HOSPITAL ENCOUNTER (OUTPATIENT)
Dept: CARDIOLOGY | Facility: HOSPITAL | Age: 84
Discharge: HOME | End: 2019-09-11
Admitting: PHYSICIAN ASSISTANT

## 2019-09-11 VITALS — BODY MASS INDEX: 25.64 KG/M2 | WEIGHT: 163.36 LBS | HEIGHT: 67 IN

## 2019-09-11 DIAGNOSIS — I10 ESSENTIAL HYPERTENSION: ICD-10-CM

## 2019-09-11 DIAGNOSIS — I25.10 CORONARY ARTERY DISEASE INVOLVING NATIVE CORONARY ARTERY OF NATIVE HEART WITHOUT ANGINA PECTORIS: ICD-10-CM

## 2019-09-11 DIAGNOSIS — I25.5 ISCHEMIC CARDIOMYOPATHY: ICD-10-CM

## 2019-09-11 PROCEDURE — 93306 TTE W/DOPPLER COMPLETE: CPT | Performed by: INTERNAL MEDICINE

## 2019-09-11 PROCEDURE — 93306 TTE W/DOPPLER COMPLETE: CPT

## 2019-09-25 LAB
BH CV ECHO MEAS - ACS: 1.7 CM
BH CV ECHO MEAS - AO MAX PG: 19.1 MMHG
BH CV ECHO MEAS - AO MEAN PG: 9 MMHG
BH CV ECHO MEAS - AO ROOT AREA (BSA CORRECTED): 1.7
BH CV ECHO MEAS - AO ROOT AREA: 8.5 CM^2
BH CV ECHO MEAS - AO ROOT DIAM: 3.3 CM
BH CV ECHO MEAS - AO V2 MAX: 218.5 CM/SEC
BH CV ECHO MEAS - AO V2 MEAN: 134.7 CM/SEC
BH CV ECHO MEAS - AO V2 VTI: 53.6 CM
BH CV ECHO MEAS - BSA(HAYCOCK): 1.9 M^2
BH CV ECHO MEAS - BSA: 1.9 M^2
BH CV ECHO MEAS - BZI_BMI: 26.3 KILOGRAMS/M^2
BH CV ECHO MEAS - BZI_METRIC_HEIGHT: 170.2 CM
BH CV ECHO MEAS - BZI_METRIC_WEIGHT: 76.2 KG
BH CV ECHO MEAS - EDV(CUBED): 112 ML
BH CV ECHO MEAS - EDV(TEICH): 108.6 ML
BH CV ECHO MEAS - EF(CUBED): 35.6 %
BH CV ECHO MEAS - EF(TEICH): 29.1 %
BH CV ECHO MEAS - ESV(CUBED): 72.1 ML
BH CV ECHO MEAS - ESV(TEICH): 76.9 ML
BH CV ECHO MEAS - FS: 13.6 %
BH CV ECHO MEAS - IVS/LVPW: 1
BH CV ECHO MEAS - IVSD: 1 CM
BH CV ECHO MEAS - LA DIMENSION(2D): 4.6 CM
BH CV ECHO MEAS - LA DIMENSION: 4.2 CM
BH CV ECHO MEAS - LA/AO: 1.3
BH CV ECHO MEAS - LAT PEAK E' VEL: 3 CM/SEC
BH CV ECHO MEAS - LV IVRT: 0.08 SEC
BH CV ECHO MEAS - LV MASS(C)D: 172 GRAMS
BH CV ECHO MEAS - LV MASS(C)DI: 91.6 GRAMS/M^2
BH CV ECHO MEAS - LVIDD: 4.8 CM
BH CV ECHO MEAS - LVIDS: 4.2 CM
BH CV ECHO MEAS - LVPWD: 0.99 CM
BH CV ECHO MEAS - MED PEAK E' VEL: 4 CM/SEC
BH CV ECHO MEAS - MITRAL HR: 92.2 BPM
BH CV ECHO MEAS - MITRAL R-R: 0.65 SEC
BH CV ECHO MEAS - MV A MAX VEL: 61.8 CM/SEC
BH CV ECHO MEAS - MV DEC SLOPE: 425.2 CM/SEC^2
BH CV ECHO MEAS - MV DEC TIME: 0.19 SEC
BH CV ECHO MEAS - MV E MAX VEL: 79.7 CM/SEC
BH CV ECHO MEAS - MV E/A: 1.3
BH CV ECHO MEAS - MV MAX PG: 2.8 MMHG
BH CV ECHO MEAS - MV MEAN PG: 0.96 MMHG
BH CV ECHO MEAS - MV V2 MAX: 83.5 CM/SEC
BH CV ECHO MEAS - MV V2 MEAN: 45.5 CM/SEC
BH CV ECHO MEAS - MV V2 VTI: 29.6 CM
BH CV ECHO MEAS - PA MAX PG (FULL): 1.2 MMHG
BH CV ECHO MEAS - PA MAX PG: 3.8 MMHG
BH CV ECHO MEAS - PA MEAN PG (FULL): 1 MMHG
BH CV ECHO MEAS - PA MEAN PG: 2.3 MMHG
BH CV ECHO MEAS - PA V2 MAX: 97.5 CM/SEC
BH CV ECHO MEAS - PA V2 MEAN: 72.1 CM/SEC
BH CV ECHO MEAS - PA V2 VTI: 21.7 CM
BH CV ECHO MEAS - PI END-D VEL: 142.9 CM/SEC
BH CV ECHO MEAS - PULM. HR: 199.3 BPM
BH CV ECHO MEAS - PULM. R-R: 0.3 SEC
BH CV ECHO MEAS - RV MAX PG: 2.6 MMHG
BH CV ECHO MEAS - RV MEAN PG: 1.3 MMHG
BH CV ECHO MEAS - RV V1 MAX: 80.2 CM/SEC
BH CV ECHO MEAS - RV V1 MEAN: 52.2 CM/SEC
BH CV ECHO MEAS - RV V1 VTI: 17 CM
BH CV ECHO MEAS - RVDD: 2.4 CM
BH CV ECHO MEAS - SI(AO): 241.5 ML/M^2
BH CV ECHO MEAS - SI(CUBED): 21.2 ML/M^2
BH CV ECHO MEAS - SI(TEICH): 16.9 ML/M^2
BH CV ECHO MEAS - SV(AO): 453.5 ML
BH CV ECHO MEAS - SV(CUBED): 39.9 ML
BH CV ECHO MEAS - SV(TEICH): 31.6 ML
BH CV ECHO MEASUREMENTS AVERAGE E/E' RATIO: 22.77
MAXIMAL PREDICTED HEART RATE: 134 BPM
STRESS TARGET HR: 114 BPM

## 2019-09-26 ENCOUNTER — TELEPHONE (OUTPATIENT)
Dept: CARDIOLOGY | Facility: CLINIC | Age: 84
End: 2019-09-26

## 2019-09-26 NOTE — TELEPHONE ENCOUNTER
Called patient and left message to return call. -;CCMA      Result Notes for Adult Transthoracic Echo Complete W/ Cont if Necessary Per Protocol     Notes recorded by Moisés Palacios PA on 9/26/2019 at 4:54 PM EDT  Follow-up as scheduled, sooner for complications.   Adult Transthoracic Echo Complete W/ Cont if Necessary Per Protocol   Order: [id number]   Status:  Final result   Visible to patient:  No (Not Released) Dx:  Essential hypertension; Ischemic card...   Details     Reading Physician Reading Date Result Priority   Braulio Burch MD 9/11/2019 Routine      Result Text     Significantly technically limited study.  Parasternal windows are uninterpretable.     1.  The left ventricle appears dilated, there is concentric left ventricular hypertrophy with a visually estimated ejection fraction of 30 ±5%.  Sheikh grade 3 diastolic dysfunction.  There appears to be posterior lateral hypokinesis more pronounced than the generalized hypokinesis identified through the remainder of the ventricle.  Moderate left atrial enlargement.  Right heart chambers are poorly visualized.     2.  There is early mitral annular calcification with no mitral stenosis and mild mitral regurgitation.  The aortic valve is poorly visualized there is no aortic stenosis and there is trivial to mild AI.  The tricuspid valve is poorly visualized but appears grossly normal.     3.  No pericardial or great vessel pathology.     4.  Pulmonary pressures cannot be calculated.

## 2019-09-27 NOTE — TELEPHONE ENCOUNTER
Patient returned my call, notified of echo results. Patient notified to keep follow up as scheduled. -ASHLEY;LIZ

## 2019-12-11 DIAGNOSIS — I10 ESSENTIAL HYPERTENSION: ICD-10-CM

## 2019-12-11 RX ORDER — RAMIPRIL 5 MG/1
CAPSULE ORAL
Qty: 90 CAPSULE | Refills: 1 | Status: SHIPPED | OUTPATIENT
Start: 2019-12-11 | End: 2020-03-02 | Stop reason: SDUPTHER

## 2020-02-11 DIAGNOSIS — I10 ESSENTIAL HYPERTENSION: ICD-10-CM

## 2020-02-11 RX ORDER — CARVEDILOL 3.12 MG/1
TABLET ORAL
Qty: 180 TABLET | Refills: 0 | Status: SHIPPED | OUTPATIENT
Start: 2020-02-11 | End: 2020-03-02 | Stop reason: SDUPTHER

## 2020-03-02 ENCOUNTER — OFFICE VISIT (OUTPATIENT)
Dept: CARDIOLOGY | Facility: CLINIC | Age: 85
End: 2020-03-02

## 2020-03-02 VITALS
BODY MASS INDEX: 25.49 KG/M2 | HEIGHT: 67 IN | DIASTOLIC BLOOD PRESSURE: 75 MMHG | OXYGEN SATURATION: 98 % | SYSTOLIC BLOOD PRESSURE: 148 MMHG | HEART RATE: 56 BPM | WEIGHT: 162.4 LBS

## 2020-03-02 DIAGNOSIS — I45.3 TRIFASCICULAR BLOCK: ICD-10-CM

## 2020-03-02 DIAGNOSIS — I25.5 ISCHEMIC CARDIOMYOPATHY: ICD-10-CM

## 2020-03-02 DIAGNOSIS — I10 ESSENTIAL HYPERTENSION: ICD-10-CM

## 2020-03-02 DIAGNOSIS — I25.10 CORONARY ARTERY DISEASE INVOLVING NATIVE CORONARY ARTERY OF NATIVE HEART WITHOUT ANGINA PECTORIS: Primary | ICD-10-CM

## 2020-03-02 PROCEDURE — 99213 OFFICE O/P EST LOW 20 MIN: CPT | Performed by: PHYSICIAN ASSISTANT

## 2020-03-02 RX ORDER — CARVEDILOL 3.12 MG/1
3.12 TABLET ORAL 2 TIMES DAILY
Qty: 180 TABLET | Refills: 3 | Status: SHIPPED | OUTPATIENT
Start: 2020-03-02 | End: 2020-09-02 | Stop reason: SDUPTHER

## 2020-03-02 RX ORDER — RAMIPRIL 5 MG/1
5 CAPSULE ORAL DAILY
Qty: 90 CAPSULE | Refills: 3 | Status: SHIPPED | OUTPATIENT
Start: 2020-03-02 | End: 2020-09-02 | Stop reason: SDUPTHER

## 2020-03-02 NOTE — PROGRESS NOTES
Problem list     Subjective   Israel Toure is a 87 y.o. male     Chief Complaint   Patient presents with   • Follow-up     6 mth f/u   Problem List:  1.  Coronary artery disease.  1.1.  MI, 1990s, inadequate data.  The patient apparently had catheterization with no follow-up mechanical intervention.  1.2.  Repeat catheterization, 2017, in the setting of unstable angina.  The patient had an occluded right coronary artery with adequate collateral flow.  He had nonobstructive disease in the LAD and circumflex.  Medical management was recommended.  2.  Ischemic cardiomyopathy, estimated EF at 30-35%.  3.  Hypertension  4.  Dyslipidemia  5.  Chronic palpitations with questionable diagnosis of atrial fibrillation, inadequate data.  6.  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    HPI  The patient presents back today for evaluation and follow-up.  He has remained stable since his last evaluation here from cardiovascular standpoint.  He did have an echo in September, 2019.  That suggested an EF at 30 to 35%.  We again have requested and recommended that the patient have consideration for referral for ICD implant given his significant ischemic cardiomyopathy.  He acknowledges risk of sudden cardiac death but wants no consideration of referral for the same.  Currently, the patient has no PND or orthopnea.  He has stable lower extremity edema.  He reports stable dyspnea as well.  He has no chest pain.  He has only infrequent palpitations at this time, nothing to suggest sustained dysrhythmic activity.  Blood pressures have been well controlled.  He has no complications with current medical regimen.  Labs are followed with his primary care provider.  The patient has no further complaints otherwise at this time.    Current Outpatient Medications on File Prior to Visit   Medication Sig Dispense Refill   • ALPRAZolam (XANAX) 1 MG tablet Take 2 mg by mouth At Night As Needed for Anxiety.      • aspirin 81 MG EC tablet Take 81 mg by mouth Daily.     • doxazosin (CARDURA) 8 MG tablet Take 4 mg by mouth Every Night.     • simvastatin (ZOCOR) 80 MG tablet Take 40 mg by mouth Every Night.     • tamsulosin (FLOMAX) 0.4 MG capsule 24 hr capsule Take 0.4 mg by mouth Daily.     • traMADol (ULTRAM) 50 MG tablet Take 50 mg by mouth As Needed for Moderate Pain .       No current facility-administered medications on file prior to visit.        Patient has no known allergies.    Past Medical History:   Diagnosis Date   • Acute MI (CMS/HCC)    • Cancer (CMS/HCC)     melanoma   • COPD (chronic obstructive pulmonary disease) (CMS/HCC)    • Diverticulosis    • Hypertension    • Hypomagnesemia    • Osteoarthritis    • Prostatitis    • Spinal stenosis        Social History     Socioeconomic History   • Marital status:      Spouse name: Not on file   • Number of children: Not on file   • Years of education: Not on file   • Highest education level: Not on file   Tobacco Use   • Smoking status: Former Smoker     Types: Cigarettes     Last attempt to quit: 1990     Years since quittin.1   • Smokeless tobacco: Never Used   Substance and Sexual Activity   • Alcohol use: No   • Drug use: No   • Sexual activity: Defer       Family History   Problem Relation Age of Onset   • No Known Problems Mother    • No Known Problems Father        Review of Systems   Constitutional: Negative.  Negative for fatigue.   HENT: Negative.  Negative for congestion, rhinorrhea and sore throat.    Eyes: Positive for visual disturbance (wears reading glasses).   Respiratory: Negative.  Negative for chest tightness, shortness of breath and wheezing.    Cardiovascular: Negative.  Negative for chest pain, palpitations and leg swelling.   Gastrointestinal: Positive for abdominal pain (abd pain when eats certain things). Negative for nausea and vomiting.   Endocrine: Positive for cold intolerance (always cold). Negative for heat intolerance.  "  Genitourinary: Positive for urgency (urgent urination). Negative for difficulty urinating and frequency.   Musculoskeletal: Positive for back pain (h/o of spinal problems). Negative for arthralgias and neck pain.   Skin: Positive for rash (body rash). Negative for wound.   Allergic/Immunologic: Negative.  Negative for environmental allergies and food allergies.   Neurological: Negative.  Negative for dizziness, syncope and light-headedness.   Hematological: Bruises/bleeds easily (bruises/blds easily).   Psychiatric/Behavioral: Negative for agitation, confusion and sleep disturbance (denies waking up smothering/SOA). The patient is nervous/anxious (occasional anxious).        Objective   Vitals:    03/02/20 1304   BP: 148/75   BP Location: Left arm   Patient Position: Sitting   Pulse: 56   SpO2: 98%   Weight: 73.7 kg (162 lb 6.4 oz)   Height: 170.2 cm (67\")      /75 (BP Location: Left arm, Patient Position: Sitting)   Pulse 56   Ht 170.2 cm (67\")   Wt 73.7 kg (162 lb 6.4 oz)   SpO2 98%   BMI 25.44 kg/m²    Lab Results (most recent)     None        Physical Exam   Constitutional: He is oriented to person, place, and time. He appears well-developed and well-nourished. No distress.   HENT:   Head: Normocephalic and atraumatic.   Eyes: Pupils are equal, round, and reactive to light. Conjunctivae, EOM and lids are normal. Lids are everted and swept, no foreign bodies found.   Neck: Normal range of motion. Neck supple. Normal carotid pulses, no hepatojugular reflux and no JVD present. Carotid bruit is not present. No thyroid mass and no thyromegaly present.   Cardiovascular: S1 normal, S2 normal, normal heart sounds, intact distal pulses and normal pulses.  No extrasystoles are present. Exam reveals no gallop, no S3, no S4, no distant heart sounds, no friction rub and no decreased pulses.   No murmur heard.  Pulses:       Radial pulses are 2+ on the right side, and 2+ on the left side.        Dorsalis pedis " pulses are 2+ on the right side, and 2+ on the left side.        Posterior tibial pulses are 2+ on the right side, and 2+ on the left side.   Pulmonary/Chest: Effort normal and breath sounds normal. No respiratory distress. He has no decreased breath sounds. He has no wheezes. He has no rhonchi. He has no rales. He exhibits no tenderness.   Abdominal: Soft. Bowel sounds are normal. He exhibits no distension, no abdominal bruit and no mass. There is no tenderness.   Negative organomegaly      Musculoskeletal: Normal range of motion. He exhibits edema (trace edema BLE). He exhibits no tenderness or deformity.   Lymphadenopathy:     He has no cervical adenopathy.     He has no axillary adenopathy.   Neurological: He is alert and oriented to person, place, and time. He has normal reflexes.   Skin: Skin is warm and dry. No rash noted. He is not diaphoretic. No erythema. No pallor.   Psychiatric: He has a normal mood and affect. His speech is normal and behavior is normal. Judgment and thought content normal. Cognition and memory are normal.   Nursing note and vitals reviewed.        Procedure   Procedures       Assessment/Plan      Diagnosis Plan   1. Coronary artery disease involving native coronary artery of native heart without angina pectoris     2. Ischemic cardiomyopathy     3. Essential hypertension  carvedilol (COREG) 3.125 MG tablet    ramipril (ALTACE) 5 MG capsule   4. Trifascicular block         1.  At this time, the patient appears stable from cardiovascular standpoint.  The patient currently denies symptoms of angina, failure, or dysrhythmias.  He appears well compensated with regards to ischemic cardiomyopathy.  He denies any symptoms to suggest significant bradycardia dysrhythmic activity, again with trifascicular block noted with baseline EKG.    2.  Blood pressures appear to be fairly well controlled at this time.  I would continue antihypertensive therapy without change.  We did give him refills of  requested medications.  That has been sent to his pharmacy.    3.  Previous EKG suggested an EF at 30 to 35%.  We again discussed consideration of ICD implant today given severe ischemic cardiomyopathy.  The patient again confirms with me that he wants no consideration for ICD implant, acknowledging risk of sudden cardiac death.  Should he change his mind on that, he will call to us.    4.  We have also discussed consideration of titration of diuretic regimen.  He has access to diuretic therapy at home, by his report.  We discussed appropriate titration, dosing, etc.  If he has questions or complications with that, he will call to the clinic.    5.  We will continue to see this gentleman every 6 months in the clinic.  He appears stable at this time.  He will call for complications prior to follow-up.        Patient's Body mass index is 25.44 kg/m². BMI is above normal parameters. Recommendations include: educational material and referral to primary care.             Electronically signed by:

## 2020-03-02 NOTE — PATIENT INSTRUCTIONS

## 2020-09-02 ENCOUNTER — OFFICE VISIT (OUTPATIENT)
Dept: CARDIOLOGY | Facility: CLINIC | Age: 85
End: 2020-09-02

## 2020-09-02 VITALS
HEART RATE: 63 BPM | BODY MASS INDEX: 25.11 KG/M2 | OXYGEN SATURATION: 100 % | HEIGHT: 67 IN | TEMPERATURE: 97.3 F | DIASTOLIC BLOOD PRESSURE: 79 MMHG | SYSTOLIC BLOOD PRESSURE: 142 MMHG | WEIGHT: 160 LBS

## 2020-09-02 DIAGNOSIS — R06.02 SOB (SHORTNESS OF BREATH): Primary | ICD-10-CM

## 2020-09-02 DIAGNOSIS — I42.0 DILATED CARDIOMYOPATHY (HCC): ICD-10-CM

## 2020-09-02 DIAGNOSIS — I50.22 CHRONIC SYSTOLIC CHF (CONGESTIVE HEART FAILURE), NYHA CLASS 3 (HCC): ICD-10-CM

## 2020-09-02 DIAGNOSIS — I25.10 CORONARY ARTERY DISEASE INVOLVING NATIVE CORONARY ARTERY OF NATIVE HEART WITHOUT ANGINA PECTORIS: ICD-10-CM

## 2020-09-02 DIAGNOSIS — I10 ESSENTIAL HYPERTENSION: ICD-10-CM

## 2020-09-02 PROCEDURE — 93000 ELECTROCARDIOGRAM COMPLETE: CPT | Performed by: PHYSICIAN ASSISTANT

## 2020-09-02 PROCEDURE — 99213 OFFICE O/P EST LOW 20 MIN: CPT | Performed by: PHYSICIAN ASSISTANT

## 2020-09-02 NOTE — PROGRESS NOTES
Problem list     Subjective   Israel Toure is a 87 y.o. male     Chief Complaint   Patient presents with   • Cardiomyopathy     presents for 6 month f/u   • Shortness of Breath   Problem List:  1.  Coronary artery disease.  1.1.  MI, 1990s, inadequate data.  The patient apparently had catheterization with no follow-up mechanical intervention.  1.2.  Repeat catheterization, 2017, in the setting of unstable angina.  The patient had an occluded right coronary artery with adequate collateral flow.  He had nonobstructive disease in the LAD and circumflex.  Medical management was recommended.  2.  Ischemic cardiomyopathy, estimated EF at 30-35%.  3.  Hypertension  4.  Dyslipidemia  5.  Chronic palpitations with questionable diagnosis of atrial fibrillation, inadequate data.  6.  Diffuse conduction system disease    HPI  This pleasant patient presents back in today for evaluation.  We have reviewed with this gentleman today his most recent echocardiogram did support an EF at 30 to 35%.  We have advised him previously, as again we did today, to make consideration for repeat estimation and evaluation of ejection fraction, and if still low, consideration for ICD implant.  He tells me at this time thing that he feels very well and would not want anything further.  He wants no work-up nor consideration of ICD at this time.  Symptomatically, the patient has stable dyspnea.  He has infrequent episodes of palpitations, nothing to suggest sustained dysrhythmic activity.  He denies PND or orthopnea.  He has no chest pain.  He reports normotensive status when checked at home.  He has no further complaints otherwise and feels that he is doing well.    Current Outpatient Medications on File Prior to Visit   Medication Sig Dispense Refill   • ALPRAZolam (XANAX) 1 MG tablet Take 2 mg by mouth At Night As Needed for Anxiety.     • aspirin 81 MG EC tablet Take 81 mg by mouth Daily.     • carvedilol (COREG) 3.125 MG tablet Take 1 tablet  by mouth 2 (Two) Times a Day. 180 tablet 3   • doxazosin (CARDURA) 4 MG tablet Take 4 mg by mouth Every Night.     • ramipril (ALTACE) 5 MG capsule Take 1 capsule by mouth Daily. 90 capsule 3   • simvastatin (ZOCOR) 40 MG tablet Take 40 mg by mouth Every Night.     • tamsulosin (FLOMAX) 0.4 MG capsule 24 hr capsule Take 0.4 mg by mouth Daily.     • traMADol (ULTRAM) 50 MG tablet Take 50 mg by mouth As Needed for Moderate Pain .       No current facility-administered medications on file prior to visit.        Patient has no known allergies.    Past Medical History:   Diagnosis Date   • Acute MI (CMS/HCC)    • Cancer (CMS/HCC)     melanoma   • COPD (chronic obstructive pulmonary disease) (CMS/HCC)    • Diverticulosis    • Hypertension    • Hypomagnesemia    • Osteoarthritis    • Prostatitis    • Spinal stenosis        Social History     Socioeconomic History   • Marital status:      Spouse name: Not on file   • Number of children: Not on file   • Years of education: Not on file   • Highest education level: Not on file   Tobacco Use   • Smoking status: Former Smoker     Types: Cigarettes     Last attempt to quit: 1990     Years since quittin.6   • Smokeless tobacco: Never Used   Substance and Sexual Activity   • Alcohol use: No   • Drug use: No   • Sexual activity: Defer       Family History   Problem Relation Age of Onset   • No Known Problems Mother    • No Known Problems Father        Review of Systems   Constitutional: Positive for fatigue. Negative for chills, diaphoresis and fever.   HENT: Negative.    Eyes: Negative.  Negative for visual disturbance.   Respiratory: Positive for shortness of breath. Negative for apnea, cough, chest tightness and wheezing.    Cardiovascular: Positive for leg swelling (ankles). Negative for chest pain and palpitations.   Gastrointestinal: Negative for abdominal pain, blood in stool, constipation, diarrhea, nausea and vomiting.   Endocrine: Negative.    Genitourinary:  "Negative.  Negative for hematuria.   Musculoskeletal: Positive for arthralgias, back pain, gait problem and neck pain. Negative for myalgias.   Skin: Negative.  Negative for rash and wound.   Allergic/Immunologic: Negative.  Negative for environmental allergies and food allergies.   Neurological: Negative for dizziness, syncope, weakness, light-headedness, numbness and headaches.   Hematological: Bruises/bleeds easily.   Psychiatric/Behavioral: Negative.  Negative for agitation and sleep disturbance. The patient is not nervous/anxious.        Objective   Vitals:    09/02/20 1122   BP: 142/79   BP Location: Left arm   Patient Position: Sitting   Pulse: 63   Temp: 97.3 °F (36.3 °C)   SpO2: 100%   Weight: 72.6 kg (160 lb)   Height: 170.2 cm (67.01\")      /79 (BP Location: Left arm, Patient Position: Sitting)   Pulse 63   Temp 97.3 °F (36.3 °C)   Ht 170.2 cm (67.01\")   Wt 72.6 kg (160 lb)   SpO2 100%   BMI 25.05 kg/m²    Lab Results (most recent)     None        Physical Exam   Constitutional: He is oriented to person, place, and time. He appears well-developed and well-nourished. No distress.   HENT:   Head: Normocephalic and atraumatic.   Eyes: Pupils are equal, round, and reactive to light. Conjunctivae, EOM and lids are normal. Lids are everted and swept, no foreign bodies found.   Neck: Normal range of motion. Neck supple. Normal carotid pulses, no hepatojugular reflux and no JVD present. Carotid bruit is not present. No thyroid mass and no thyromegaly present.   Cardiovascular: S1 normal, S2 normal, normal heart sounds, intact distal pulses and normal pulses.  No extrasystoles are present. Exam reveals no gallop, no S3, no S4, no distant heart sounds, no friction rub and no decreased pulses.   No murmur heard.  Pulses:       Radial pulses are 2+ on the right side, and 2+ on the left side.        Dorsalis pedis pulses are 2+ on the right side, and 2+ on the left side.        Posterior tibial pulses are " 2+ on the right side, and 2+ on the left side.   Pulmonary/Chest: Effort normal and breath sounds normal. No respiratory distress. He has no decreased breath sounds. He has no wheezes. He has no rhonchi. He has no rales. He exhibits no tenderness.   Abdominal: Soft. Bowel sounds are normal. He exhibits no distension, no abdominal bruit and no mass. There is no tenderness.   Negative organomegaly      Musculoskeletal: Normal range of motion. He exhibits edema (trace edema BLE). He exhibits no tenderness or deformity.   Lymphadenopathy:     He has no cervical adenopathy.     He has no axillary adenopathy.   Neurological: He is alert and oriented to person, place, and time. He has normal reflexes.   Skin: Skin is warm and dry. No rash noted. He is not diaphoretic. No erythema. No pallor.   Psychiatric: He has a normal mood and affect. His speech is normal and behavior is normal. Judgment and thought content normal. Cognition and memory are normal.   Nursing note and vitals reviewed.        Procedure     ECG 12 Lead  Date/Time: 9/2/2020 11:25 AM  Performed by: Moisés Palacios PA  Authorized by: Moisés Palacios PA   Comparison: compared with previous ECG from 8/27/2019  Comparison to previous ECG: Sinus rhythm at 60, left axis deviation, right bundle branch block type morphology, left anterior fascicular block noted, no acute changes noted.  Borderline first-degree AV block, of note.                 Assessment/Plan      Diagnosis Plan   1. SOB (shortness of breath)  ECG 12 Lead   2. Coronary artery disease involving native coronary artery of native heart without angina pectoris  ECG 12 Lead   3. Essential hypertension  ECG 12 Lead    carvedilol (COREG) 3.125 MG tablet    ramipril (ALTACE) 5 MG capsule   4. Chronic systolic CHF (congestive heart failure), NYHA class 3 (CMS/HCC)  ECG 12 Lead   5. Dilated cardiomyopathy (CMS/HCC)         1.  At this time, the patient is doing well from cardiovascular standpoint, per his  report.  He currently denies chest pain.  He appears well compensated for the most part from standpoint of his cardiomyopathy.  He still has class III symptoms at times.  The patient reports stable dyspnea otherwise.  He has no further cardiac complaints.    2.  We have discussed consideration for repeating an echocardiogram.  We have discussed consideration historically for ICD.  The patient feels so well that he wants nothing further at this time.  He acknowledges risks of not proceeding with these recommendations.    3.  I would continue medical regimen without change.  We had given him refills at his request.  That has been sent to his pharmacy.    4.  We will make no changes otherwise.  We will see him back on 6-month intervals, sooner if indicated by clinical course or with complications otherwise.         Israel Toure  reports that he quit smoking about 30 years ago. His smoking use included cigarettes. He has never used smokeless tobacco.      Patient's Body mass index is 25.05 kg/m². BMI is within normal parameters. No follow-up required..     Advance Care Planning   ACP discussion was held with the patient during this visit. Patient has an advance directive in EMR which is still valid.           Electronically signed by:

## 2020-09-04 RX ORDER — CARVEDILOL 3.12 MG/1
3.12 TABLET ORAL 2 TIMES DAILY
Qty: 180 TABLET | Refills: 3 | Status: SHIPPED | OUTPATIENT
Start: 2020-09-04 | End: 2021-10-13 | Stop reason: SDUPTHER

## 2020-09-04 RX ORDER — SIMVASTATIN 40 MG
40 TABLET ORAL NIGHTLY
Qty: 90 TABLET | Refills: 3 | Status: SHIPPED | OUTPATIENT
Start: 2020-09-04 | End: 2023-02-16 | Stop reason: SDUPTHER

## 2020-09-04 RX ORDER — RAMIPRIL 5 MG/1
5 CAPSULE ORAL DAILY
Qty: 90 CAPSULE | Refills: 3 | Status: SHIPPED | OUTPATIENT
Start: 2020-09-04 | End: 2021-10-13 | Stop reason: SDUPTHER

## 2021-09-29 ENCOUNTER — OFFICE VISIT (OUTPATIENT)
Dept: CARDIOLOGY | Facility: CLINIC | Age: 86
End: 2021-09-29

## 2021-09-29 VITALS
BODY MASS INDEX: 24.48 KG/M2 | OXYGEN SATURATION: 98 % | HEIGHT: 67 IN | WEIGHT: 156 LBS | SYSTOLIC BLOOD PRESSURE: 118 MMHG | DIASTOLIC BLOOD PRESSURE: 70 MMHG | HEART RATE: 76 BPM

## 2021-09-29 DIAGNOSIS — I42.0 DILATED CARDIOMYOPATHY (HCC): Primary | ICD-10-CM

## 2021-09-29 DIAGNOSIS — I48.92 NEW ONSET ATRIAL FLUTTER (HCC): ICD-10-CM

## 2021-09-29 DIAGNOSIS — I45.3 TRIFASCICULAR BLOCK: ICD-10-CM

## 2021-09-29 DIAGNOSIS — I25.5 ISCHEMIC CARDIOMYOPATHY: ICD-10-CM

## 2021-09-29 DIAGNOSIS — R06.02 SOB (SHORTNESS OF BREATH): ICD-10-CM

## 2021-09-29 DIAGNOSIS — I25.10 CORONARY ARTERY DISEASE INVOLVING NATIVE CORONARY ARTERY OF NATIVE HEART WITHOUT ANGINA PECTORIS: ICD-10-CM

## 2021-09-29 PROCEDURE — 93000 ELECTROCARDIOGRAM COMPLETE: CPT | Performed by: NURSE PRACTITIONER

## 2021-09-29 PROCEDURE — 99214 OFFICE O/P EST MOD 30 MIN: CPT | Performed by: NURSE PRACTITIONER

## 2021-09-29 RX ORDER — POTASSIUM CHLORIDE 750 MG/1
TABLET, FILM COATED, EXTENDED RELEASE ORAL DAILY
COMMUNITY
Start: 2021-09-28 | End: 2023-02-16 | Stop reason: SDUPTHER

## 2021-09-29 RX ORDER — FUROSEMIDE 20 MG/1
TABLET ORAL DAILY
COMMUNITY
Start: 2021-09-28 | End: 2021-10-13 | Stop reason: SDUPTHER

## 2021-10-07 ENCOUNTER — HOSPITAL ENCOUNTER (OUTPATIENT)
Dept: CARDIOLOGY | Facility: HOSPITAL | Age: 86
Discharge: HOME | End: 2021-10-07

## 2021-10-07 DIAGNOSIS — R06.02 SOB (SHORTNESS OF BREATH): ICD-10-CM

## 2021-10-07 DIAGNOSIS — I42.0 DILATED CARDIOMYOPATHY (HCC): ICD-10-CM

## 2021-10-07 DIAGNOSIS — I25.5 ISCHEMIC CARDIOMYOPATHY: ICD-10-CM

## 2021-10-07 DIAGNOSIS — I45.3 TRIFASCICULAR BLOCK: ICD-10-CM

## 2021-10-07 DIAGNOSIS — I25.10 CORONARY ARTERY DISEASE INVOLVING NATIVE CORONARY ARTERY OF NATIVE HEART WITHOUT ANGINA PECTORIS: ICD-10-CM

## 2021-10-07 DIAGNOSIS — I48.92 NEW ONSET ATRIAL FLUTTER (HCC): ICD-10-CM

## 2021-10-07 PROCEDURE — 93018 CV STRESS TEST I&R ONLY: CPT | Performed by: INTERNAL MEDICINE

## 2021-10-07 PROCEDURE — 93017 CV STRESS TEST TRACING ONLY: CPT

## 2021-10-07 PROCEDURE — A9500 TC99M SESTAMIBI: HCPCS | Performed by: INTERNAL MEDICINE

## 2021-10-07 PROCEDURE — 78452 HT MUSCLE IMAGE SPECT MULT: CPT

## 2021-10-07 PROCEDURE — 93306 TTE W/DOPPLER COMPLETE: CPT

## 2021-10-07 PROCEDURE — 25010000002 REGADENOSON 0.4 MG/5ML SOLUTION: Performed by: INTERNAL MEDICINE

## 2021-10-07 PROCEDURE — 0 TECHNETIUM SESTAMIBI: Performed by: INTERNAL MEDICINE

## 2021-10-07 PROCEDURE — 78452 HT MUSCLE IMAGE SPECT MULT: CPT | Performed by: INTERNAL MEDICINE

## 2021-10-07 PROCEDURE — 93306 TTE W/DOPPLER COMPLETE: CPT | Performed by: INTERNAL MEDICINE

## 2021-10-07 RX ADMIN — TECHNETIUM TC 99M SESTAMIBI 1 DOSE: 1 INJECTION INTRAVENOUS at 10:21

## 2021-10-07 RX ADMIN — TECHNETIUM TC 99M SESTAMIBI 1 DOSE: 1 INJECTION INTRAVENOUS at 11:34

## 2021-10-07 RX ADMIN — REGADENOSON 0.4 MG: 0.08 INJECTION, SOLUTION INTRAVENOUS at 11:34

## 2021-10-10 LAB
BH CV REST NUCLEAR ISOTOPE DOSE: 10 MCI
BH CV STRESS COMMENTS STAGE 1: NORMAL
BH CV STRESS DOSE REGADENOSON STAGE 1: 0.4
BH CV STRESS DURATION MIN STAGE 1: 0
BH CV STRESS DURATION SEC STAGE 1: 10
BH CV STRESS NUCLEAR ISOTOPE DOSE: 30 MCI
BH CV STRESS PROTOCOL 1: NORMAL
BH CV STRESS RECOVERY BP: NORMAL MMHG
BH CV STRESS RECOVERY HR: 82 BPM
BH CV STRESS STAGE 1: 1
MAXIMAL PREDICTED HEART RATE: 132 BPM
PERCENT MAX PREDICTED HR: 82.58 %
STRESS BASELINE BP: NORMAL MMHG
STRESS BASELINE HR: 78 BPM
STRESS PERCENT HR: 97 %
STRESS POST PEAK BP: NORMAL MMHG
STRESS POST PEAK HR: 109 BPM
STRESS TARGET HR: 112 BPM

## 2021-10-11 ENCOUNTER — TELEPHONE (OUTPATIENT)
Dept: CARDIOLOGY | Facility: CLINIC | Age: 86
End: 2021-10-11

## 2021-10-11 NOTE — TELEPHONE ENCOUNTER
Patient's wife aware of stress test results. Follow up scheduled Thursday. Ivone Ware MA      ----- Message from NELLA العلي sent at 10/10/2021 10:34 PM EDT -----  Regarding: FW:  1 week follow up.  Preferably this week  ----- Message -----  From: Braulio Burch MD  Sent: 10/10/2021   2:50 PM EDT  To: NELLA العلي    Result Text  1.  Scintigraphy is compatible with a large inferior wall infarct with moderate juany-infarct ischemia in the inferoseptal, inferolateral, and diaphragmatic segments.     2.  Severely depressed post-rest ejection fraction 24% with global hypokinesis most pronounced in the inferoseptal and inferior walls.     3.  No evidence of pharmacologically induced transient ischemic dilation or of increased lung uptake of radiopharmaceutical.

## 2021-10-13 ENCOUNTER — OFFICE VISIT (OUTPATIENT)
Dept: CARDIOLOGY | Facility: CLINIC | Age: 86
End: 2021-10-13

## 2021-10-13 VITALS
WEIGHT: 154.6 LBS | HEART RATE: 62 BPM | HEIGHT: 67 IN | BODY MASS INDEX: 24.27 KG/M2 | OXYGEN SATURATION: 99 % | DIASTOLIC BLOOD PRESSURE: 63 MMHG | SYSTOLIC BLOOD PRESSURE: 105 MMHG

## 2021-10-13 DIAGNOSIS — I10 ESSENTIAL HYPERTENSION: ICD-10-CM

## 2021-10-13 DIAGNOSIS — I25.10 CORONARY ARTERY DISEASE INVOLVING NATIVE CORONARY ARTERY OF NATIVE HEART WITHOUT ANGINA PECTORIS: ICD-10-CM

## 2021-10-13 DIAGNOSIS — R60.0 BILATERAL LEG EDEMA: ICD-10-CM

## 2021-10-13 DIAGNOSIS — R06.02 SOB (SHORTNESS OF BREATH): ICD-10-CM

## 2021-10-13 DIAGNOSIS — I42.0 DILATED CARDIOMYOPATHY (HCC): ICD-10-CM

## 2021-10-13 PROCEDURE — 99214 OFFICE O/P EST MOD 30 MIN: CPT | Performed by: PHYSICIAN ASSISTANT

## 2021-10-13 RX ORDER — CARVEDILOL 3.12 MG/1
3.12 TABLET ORAL 2 TIMES DAILY
Qty: 180 TABLET | Refills: 3 | Status: SHIPPED | OUTPATIENT
Start: 2021-10-13 | End: 2023-01-10

## 2021-10-13 RX ORDER — FUROSEMIDE 40 MG/1
40 TABLET ORAL DAILY
Qty: 30 TABLET | Refills: 0 | Status: SHIPPED | OUTPATIENT
Start: 2021-10-13 | End: 2023-02-16

## 2021-10-13 RX ORDER — RAMIPRIL 5 MG/1
5 CAPSULE ORAL DAILY
Qty: 90 CAPSULE | Refills: 3 | Status: SHIPPED | OUTPATIENT
Start: 2021-10-13 | End: 2022-11-28

## 2021-10-13 NOTE — PATIENT INSTRUCTIONS

## 2021-10-13 NOTE — PROGRESS NOTES
Problem list     Subjective   Israel Toure is a 88 y.o. male     Chief Complaint   Patient presents with   • Follow-up     ABN testing ( Stress )    Problem List:  1.  Coronary artery disease.  1.1.  MI, 1990s, inadequate data.  The patient apparently had catheterization with no follow-up mechanical intervention.  1.2.  Repeat catheterization, 2017, in the setting of unstable angina.  The patient had an occluded right coronary artery with adequate collateral flow.  He had nonobstructive disease in the LAD and circumflex.  Medical management was recommended.  2.  Ischemic cardiomyopathy, estimated EF at 30-35%.  3.  Hypertension  4.  Dyslipidemia  5.  Chronic palpitations with questionable diagnosis of atrial fibrillation, inadequate data.  6.  Diffuse conduction system disease  7.  Chest x-ray identifying moderate sized left pleural effusion, 9/28/2021.    HPI  The patient presents in today for review of study results.  He was scheduled for testing after his last evaluation here.  Stress test suggests inferior wall infarct with moderate juany-infarct ischemia in the inferoseptal, inferolateral, and diaphragmatic segments.  Post stress EF is at 24%.  The patient's echocardiogram is available and preliminary report only today.  Because of abnormalities noted on stress test, the patient was returned to the clinic today.  Clinically, he is not doing well.  He was recently diagnosed with a moderate sized left pleural effusion.  He was placed on diuretic therapy by his primary care provider.  He had a small right lower lobe effusion at that time.  He feels that this is all related to Covid.  Still, he presents back today for evaluation.  Symptomatically, he feels reasonably well.  He still has dyspnea, PND, and even orthopnea.  He has edema.  He feels that he has mild failure.  He has no further complaints.    Current Outpatient Medications on File Prior to Visit   Medication Sig Dispense Refill   • ALPRAZolam (XANAX)  1 MG tablet Take 2 mg by mouth At Night As Needed for Anxiety.     • aspirin 81 MG EC tablet Take 81 mg by mouth Daily.     • doxazosin (CARDURA) 4 MG tablet Take 4 mg by mouth Every Night.     • potassium chloride 10 MEQ CR tablet Daily.     • simvastatin (ZOCOR) 40 MG tablet Take 1 tablet by mouth Every Night. 90 tablet 3   • traMADol (ULTRAM) 50 MG tablet Take 50 mg by mouth As Needed for Moderate Pain .       No current facility-administered medications on file prior to visit.       Patient has no known allergies.    Past Medical History:   Diagnosis Date   • Acute MI (HCC)    • Cancer (HCC)     melanoma   • COPD (chronic obstructive pulmonary disease) (HCC)    • COVID-19    • Diverticulosis    • Hypertension    • Hypomagnesemia    • Osteoarthritis    • Prostatitis    • Spinal stenosis        Social History     Socioeconomic History   • Marital status:    Tobacco Use   • Smoking status: Former Smoker     Types: Cigarettes     Quit date:      Years since quittin.8   • Smokeless tobacco: Never Used   Substance and Sexual Activity   • Alcohol use: No   • Drug use: No   • Sexual activity: Defer       Family History   Problem Relation Age of Onset   • No Known Problems Mother    • No Known Problems Father        Review of Systems   Constitutional: Positive for fatigue. Negative for chills and fever.   HENT: Negative.  Negative for congestion, rhinorrhea and sore throat.    Eyes: Positive for visual disturbance (reading glasses).   Respiratory: Positive for shortness of breath. Negative for chest tightness.    Cardiovascular: Positive for leg swelling. Negative for chest pain and palpitations.   Gastrointestinal: Negative.    Endocrine: Negative.    Genitourinary: Negative.    Musculoskeletal: Positive for arthralgias, back pain and neck pain.   Skin: Negative.  Negative for rash and wound.   Allergic/Immunologic: Negative.  Negative for environmental allergies.   Neurological: Negative.  Negative for  "dizziness, weakness, numbness and headaches.   Hematological: Bruises/bleeds easily (bleeds).   Psychiatric/Behavioral: Negative.  Negative for sleep disturbance.       Objective   Vitals:    10/13/21 1553   BP: 105/63   BP Location: Left arm   Patient Position: Sitting   Pulse: 62   SpO2: 99%   Weight: 70.1 kg (154 lb 9.6 oz)   Height: 170.2 cm (67\")      /63 (BP Location: Left arm, Patient Position: Sitting)   Pulse 62   Ht 170.2 cm (67\")   Wt 70.1 kg (154 lb 9.6 oz)   SpO2 99%   BMI 24.21 kg/m²    Lab Results (most recent)     None        Physical Exam  Vitals and nursing note reviewed.   Constitutional:       General: He is not in acute distress.     Appearance: He is well-developed.   HENT:      Head: Normocephalic and atraumatic.   Eyes:      Conjunctiva/sclera: Conjunctivae normal.      Pupils: Pupils are equal, round, and reactive to light.   Neck:      Vascular: No JVD.      Trachea: No tracheal deviation.   Cardiovascular:      Rate and Rhythm: Normal rate and regular rhythm.      Heart sounds: Normal heart sounds.   Pulmonary:      Effort: Pulmonary effort is normal.      Breath sounds: Examination of the left-middle field reveals decreased breath sounds. Examination of the right-lower field reveals rales. Examination of the left-lower field reveals decreased breath sounds. Decreased breath sounds and rales present.   Abdominal:      General: Bowel sounds are normal. There is no distension.      Palpations: Abdomen is soft. There is no mass.      Tenderness: There is no abdominal tenderness. There is no guarding or rebound.   Musculoskeletal:         General: No tenderness or deformity. Normal range of motion.      Cervical back: Normal range of motion and neck supple.   Skin:     General: Skin is warm and dry.      Coloration: Skin is not pale.      Findings: No erythema or rash.   Neurological:      Mental Status: He is alert and oriented to person, place, and time.   Psychiatric:         " Behavior: Behavior normal.         Thought Content: Thought content normal.         Judgment: Judgment normal.           Procedure   Procedures       Assessment/Plan      Diagnosis Plan   1. Dilated cardiomyopathy (HCC)     2. Coronary artery disease involving native coronary artery of native heart without angina pectoris     3. Essential hypertension  carvedilol (COREG) 3.125 MG tablet    ramipril (ALTACE) 5 MG capsule   4. Bilateral leg edema  Basic Metabolic Panel    Magnesium    BNP   5. SOB (shortness of breath)  Basic Metabolic Panel    Magnesium    BNP    XR Chest PA & Lateral    Ambulatory Referral to Pulmonology     1.  The patient presents today to discuss test results.  Because of his symptoms at last evaluation, he was scheduled for nuclear stress testing and an echocardiogram.  Nuclear stress test supported inferior infarct with moderate juany-infarct ischemia.  I feel that this correlates to his known chronic total occlusion of the RCA.  With his degree of moderate juany-infarct ischemia however, I could not exactly exclude collateral flow dependent ischemia.  Still, the patient feels poorly enough that he would not want to pursue any further cardiac testing at all.  I would like to see him back in the next 1 to 2 weeks, after appropriate diuresis, and reevaluate and discuss this with him at that time.    2.  For now, I will increase furosemide from 20 to 40 mg daily and have discussed with him and his daughter how to titrate diuretic therapy further if needed at home.    3.  He has a moderate sized left-sided pleural effusion which is persistent by exam today.  I hear minimal findings to the right base.  I feel that he needs to be evaluated at least initially through pulmonology.  He may need thoracentesis, but I will defer that to pulmonology.  We will continue aggressive diuresis for now.    4.  The patient has severe cardiomyopathy with an estimated EF of 30% or lower by numerous studies recently.   We again have discussed ICD placement.  The patient again confirms he wants no consideration for that, acknowledging risk.    5.  I will continue beta-blocker and ACE inhibitor therapy otherwise for now.  I will be seeing him back in 1 to 2 weeks after appropriate diuresis, or at least in hopes of appropriate diuresis, and can recommend him further at that time.  For unstable clinical course, he will proceed on to the emergency room.  I will get a chest x-ray and laboratories upon his return to the clinic.                 Advance Care Planning   ACP discussion was held with the patient during this visit. Patient has an advance directive (not in EMR), copy requested.    Electronically signed by:

## 2021-10-19 ENCOUNTER — TELEPHONE (OUTPATIENT)
Dept: CARDIOLOGY | Facility: CLINIC | Age: 86
End: 2021-10-19

## 2021-10-19 NOTE — TELEPHONE ENCOUNTER
We received a VM from a woman stating pt was at [facility] and needed labs and chest x-ray, but never got lab orders.       I called pt and informed him that I would send his orders ASA, he stated he already got labs done and not to worry about it.

## 2021-10-20 ENCOUNTER — CLINICAL SUPPORT (OUTPATIENT)
Dept: CARDIOLOGY | Facility: CLINIC | Age: 86
End: 2021-10-20

## 2021-10-20 VITALS
BODY MASS INDEX: 23.39 KG/M2 | DIASTOLIC BLOOD PRESSURE: 74 MMHG | HEART RATE: 72 BPM | OXYGEN SATURATION: 100 % | TEMPERATURE: 97 F | HEIGHT: 67 IN | WEIGHT: 149 LBS | SYSTOLIC BLOOD PRESSURE: 110 MMHG

## 2021-10-20 DIAGNOSIS — R06.02 SOB (SHORTNESS OF BREATH): ICD-10-CM

## 2021-10-20 DIAGNOSIS — I42.0 CARDIOMYOPATHY, DILATED (HCC): Primary | ICD-10-CM

## 2021-10-27 LAB
BH CV ECHO MEAS - ACS: 1.6 CM
BH CV ECHO MEAS - AO MAX PG (FULL): 11.5 MMHG
BH CV ECHO MEAS - AO MAX PG: 12.8 MMHG
BH CV ECHO MEAS - AO MEAN PG (FULL): 7 MMHG
BH CV ECHO MEAS - AO MEAN PG: 8 MMHG
BH CV ECHO MEAS - AO ROOT AREA (BSA CORRECTED): 1.8
BH CV ECHO MEAS - AO ROOT AREA: 8.8 CM^2
BH CV ECHO MEAS - AO ROOT DIAM: 3.4 CM
BH CV ECHO MEAS - AO V2 MAX: 179 CM/SEC
BH CV ECHO MEAS - AO V2 MEAN: 129 CM/SEC
BH CV ECHO MEAS - AO V2 VTI: 32.7 CM
BH CV ECHO MEAS - AVA(I,A): 1 CM^2
BH CV ECHO MEAS - AVA(I,D): 1 CM^2
BH CV ECHO MEAS - AVA(V,A): 1 CM^2
BH CV ECHO MEAS - AVA(V,D): 1 CM^2
BH CV ECHO MEAS - BSA(HAYCOCK): 1.8 M^2
BH CV ECHO MEAS - BSA: 1.8 M^2
BH CV ECHO MEAS - BZI_BMI: 24.4 KILOGRAMS/M^2
BH CV ECHO MEAS - BZI_METRIC_HEIGHT: 170.2 CM
BH CV ECHO MEAS - BZI_METRIC_WEIGHT: 70.8 KG
BH CV ECHO MEAS - EDV(CUBED): 160.1 ML
BH CV ECHO MEAS - EDV(MOD-SP4): 104 ML
BH CV ECHO MEAS - EDV(TEICH): 143.1 ML
BH CV ECHO MEAS - EF(CUBED): 27.9 %
BH CV ECHO MEAS - EF(TEICH): 22.3 %
BH CV ECHO MEAS - EF_3D-VOL: 29 %
BH CV ECHO MEAS - ESV(CUBED): 115.5 ML
BH CV ECHO MEAS - ESV(MOD-SP4): 51.2 ML
BH CV ECHO MEAS - ESV(TEICH): 111.2 ML
BH CV ECHO MEAS - FS: 10.3 %
BH CV ECHO MEAS - IVS/LVPW: 1.1
BH CV ECHO MEAS - IVSD: 1.3 CM
BH CV ECHO MEAS - LA DIMENSION: 5.3 CM
BH CV ECHO MEAS - LA/AO: 1.6
BH CV ECHO MEAS - LV DIASTOLIC VOL/BSA (35-75): 57.2 ML/M^2
BH CV ECHO MEAS - LV IVRT: 0.11 SEC
BH CV ECHO MEAS - LV MASS(C)D: 277.6 GRAMS
BH CV ECHO MEAS - LV MASS(C)DI: 152.6 GRAMS/M^2
BH CV ECHO MEAS - LV MAX PG: 1.3 MMHG
BH CV ECHO MEAS - LV MEAN PG: 1 MMHG
BH CV ECHO MEAS - LV SYSTOLIC VOL/BSA (12-30): 28.1 ML/M^2
BH CV ECHO MEAS - LV V1 MAX: 57.7 CM/SEC
BH CV ECHO MEAS - LV V1 MEAN: 38.6 CM/SEC
BH CV ECHO MEAS - LV V1 VTI: 10.5 CM
BH CV ECHO MEAS - LVIDD: 5.4 CM
BH CV ECHO MEAS - LVIDS: 4.9 CM
BH CV ECHO MEAS - LVLD AP4: 6.6 CM
BH CV ECHO MEAS - LVLS AP4: 5.8 CM
BH CV ECHO MEAS - LVOT AREA (M): 3.1 CM^2
BH CV ECHO MEAS - LVOT AREA: 3.1 CM^2
BH CV ECHO MEAS - LVOT DIAM: 2 CM
BH CV ECHO MEAS - LVPWD: 1.2 CM
BH CV ECHO MEAS - MR MAX PG: 87.8 MMHG
BH CV ECHO MEAS - MR MAX VEL: 467.7 CM/SEC
BH CV ECHO MEAS - MR MEAN PG: 53 MMHG
BH CV ECHO MEAS - MR MEAN VEL: 343 CM/SEC
BH CV ECHO MEAS - MR VTI: 144 CM
BH CV ECHO MEAS - MV DEC SLOPE: 470.5 CM/SEC^2
BH CV ECHO MEAS - MV E MAX VEL: 94.7 CM/SEC
BH CV ECHO MEAS - MV MAX PG: 4.8 MMHG
BH CV ECHO MEAS - MV MEAN PG: 2 MMHG
BH CV ECHO MEAS - MV P1/2T MAX VEL: 114 CM/SEC
BH CV ECHO MEAS - MV P1/2T: 71 MSEC
BH CV ECHO MEAS - MV V2 MAX: 109 CM/SEC
BH CV ECHO MEAS - MV V2 MEAN: 60.9 CM/SEC
BH CV ECHO MEAS - MV V2 VTI: 20.2 CM
BH CV ECHO MEAS - MVA P1/2T LCG: 1.9 CM^2
BH CV ECHO MEAS - MVA(P1/2T): 3.1 CM^2
BH CV ECHO MEAS - MVA(VTI): 1.6 CM^2
BH CV ECHO MEAS - RAP SYSTOLE: 10 MMHG
BH CV ECHO MEAS - RVDD: 2.6 CM
BH CV ECHO MEAS - RVSP: 52.8 MMHG
BH CV ECHO MEAS - SI(AO): 158.4 ML/M^2
BH CV ECHO MEAS - SI(CUBED): 24.5 ML/M^2
BH CV ECHO MEAS - SI(LVOT): 18.1 ML/M^2
BH CV ECHO MEAS - SI(MOD-SP4): 29 ML/M^2
BH CV ECHO MEAS - SI(TEICH): 17.5 ML/M^2
BH CV ECHO MEAS - SV(AO): 288.2 ML
BH CV ECHO MEAS - SV(CUBED): 44.6 ML
BH CV ECHO MEAS - SV(LVOT): 33 ML
BH CV ECHO MEAS - SV(MOD-SP4): 52.8 ML
BH CV ECHO MEAS - SV(TEICH): 31.9 ML
BH CV ECHO MEAS - TR MAX VEL: 327 CM/SEC
MAXIMAL PREDICTED HEART RATE: 132 BPM
STRESS TARGET HR: 112 BPM

## 2021-11-01 ENCOUNTER — OFFICE VISIT (OUTPATIENT)
Dept: CARDIOLOGY | Facility: CLINIC | Age: 86
End: 2021-11-01

## 2021-11-01 VITALS
DIASTOLIC BLOOD PRESSURE: 79 MMHG | HEIGHT: 67 IN | WEIGHT: 150.4 LBS | HEART RATE: 79 BPM | SYSTOLIC BLOOD PRESSURE: 112 MMHG | BODY MASS INDEX: 23.61 KG/M2 | OXYGEN SATURATION: 94 %

## 2021-11-01 DIAGNOSIS — I25.10 CORONARY ARTERY DISEASE INVOLVING NATIVE CORONARY ARTERY OF NATIVE HEART WITHOUT ANGINA PECTORIS: Primary | ICD-10-CM

## 2021-11-01 DIAGNOSIS — I10 ESSENTIAL HYPERTENSION: ICD-10-CM

## 2021-11-01 DIAGNOSIS — I50.22 CHRONIC SYSTOLIC CHF (CONGESTIVE HEART FAILURE), NYHA CLASS 3 (HCC): ICD-10-CM

## 2021-11-01 DIAGNOSIS — R93.89 ABNORMAL CT OF THE CHEST: ICD-10-CM

## 2021-11-01 DIAGNOSIS — I25.5 ISCHEMIC CARDIOMYOPATHY: ICD-10-CM

## 2021-11-01 PROCEDURE — 99214 OFFICE O/P EST MOD 30 MIN: CPT | Performed by: NURSE PRACTITIONER

## 2021-11-01 NOTE — PATIENT INSTRUCTIONS
Coronary Angiogram With Stent  Coronary angiogram with stent placement is a procedure to widen or open a narrow blood vessel of the heart (coronary artery). Arteries may become blocked by cholesterol buildup (plaques) in the lining of the artery wall. When a coronary artery becomes partially blocked, blood flow to that area decreases. This may lead to chest pain or a heart attack (myocardial infarction).  A stent is a small piece of metal that looks like mesh or spring. Stent placement may be done as treatment after a heart attack, or to prevent a heart attack if a blocked artery is found by a coronary angiogram.  Let your health care provider know about:  · Any allergies you have, including allergies to medicines or contrast dye.  · All medicines you are taking, including vitamins, herbs, eye drops, creams, and over-the-counter medicines.  · Any problems you or family members have had with anesthetic medicines.  · Any blood disorders you have.  · Any surgeries you have had.  · Any medical conditions you have, including kidney problems or kidney failure.  · Whether you are pregnant or may be pregnant.  · Whether you are breastfeeding.  What are the risks?  Generally, this is a safe procedure. However, serious problems may occur, including:  · Damage to nearby structures or organs, such as the heart, blood vessels, or kidneys.  · A return of blockage.  · Bleeding, infection, or bruising at the insertion site.  · A collection of blood under the skin (hematoma) at the insertion site.  · A blood clot in another part of the body.  · Allergic reaction to medicines or dyes.  · Bleeding into the abdomen (retroperitoneal bleeding).  · Stroke (rare).  · Heart attack (rare).  What happens before the procedure?  Staying hydrated  Follow instructions from your health care provider about hydration, which may include:  · Up to 2 hours before the procedure - you may continue to drink clear liquids, such as water, clear fruit juice,  black coffee, and plain tea.    Eating and drinking restrictions  Follow instructions from your health care provider about eating and drinking, which may include:  · 8 hours before the procedure - stop eating heavy meals or foods, such as meat, fried foods, or fatty foods.  · 6 hours before the procedure - stop eating light meals or foods, such as toast or cereal.  · 2 hours before the procedure - stop drinking clear liquids.  Medicines  Ask your health care provider about:  · Changing or stopping your regular medicines. This is especially important if you are taking diabetes medicines or blood thinners.  · Taking medicines such as aspirin and ibuprofen. These medicines can thin your blood. Do not take these medicines unless your health care provider tells you to take them.  ? Generally, aspirin is recommended before a thin tube, called a catheter, is passed through a blood vessel and inserted into the heart (cardiac catheterization).  · Taking over-the-counter medicines, vitamins, herbs, and supplements.  General instructions  · Do not use any products that contain nicotine or tobacco for at least 4 weeks before the procedure. These products include cigarettes, e-cigarettes, and chewing tobacco. If you need help quitting, ask your health care provider.  · Plan to have someone take you home from the hospital or clinic.  · If you will be going home right after the procedure, plan to have someone with you for 24 hours.  · You may have tests and imaging procedures.  · Ask your health care provider:  ? How your insertion site will be marked. Ask which artery will be used for the procedure.  ? What steps will be taken to help prevent infection. These may include:  § Removing hair at the insertion site.  § Washing skin with a germ-killing soap.  § Taking antibiotic medicine.  What happens during the procedure?    · An IV will be inserted into one of your veins.  · Electrodes may be placed on your chest to monitor your  heart rate during the procedure.  · You will be given one or more of the following:  ? A medicine to help you relax (sedative).  ? A medicine to numb the area (local anesthetic) for catheter insertion.  · A small incision will be made for catheter insertion.  · The catheter will be inserted into an artery using a guide wire. The location may be in your groin, your wrist, or the fold of your arm (near your elbow).  · An X-ray procedure (fluoroscopy) will be used to help guide the catheter to the opening of the heart arteries.  · A dye will be injected into the catheter. X-rays will be taken. The dye helps to show where any narrowing or blockages are located in the arteries.  · Tell your health care provider if you have chest pain or trouble breathing.  · A tiny wire will be guided to the blocked spot, and a balloon will be inflated to make the artery wider.  · The stent will be expanded to crush the plaques into the wall of the vessel. The stent will hold the area open and improve the blood flow. Most stents have a drug coating to reduce the risk of the stent narrowing over time.  · The artery may be made wider using a drill, laser, or other tools that remove plaques.  · The catheter will be removed when the blood flow improves. The stent will stay where it was placed, and the lining of the artery will grow over it.  · A bandage (dressing) will be placed on the insertion site. Pressure will be applied to stop bleeding.  · The IV will be removed.  This procedure may vary among health care providers and hospitals.  What happens after the procedure?  · Your blood pressure, heart rate, breathing rate, and blood oxygen level will be monitored until you leave the hospital or clinic.  · If the procedure is done through the leg, you will lie flat in bed for a few hours or for as long as told by your health care provider. You will be instructed not to bend or cross your legs.  · The insertion site and the pulse in your foot  or wrist will be checked often.  · You may have more blood tests, X-rays, and a test that records the electrical activity of your heart (electrocardiogram, or ECG).  · Do not drive for 24 hours if you were given a sedative during your procedure.  Summary  · Coronary angiogram with stent placement is a procedure to widen or open a narrowed coronary artery. This is done to treat heart problems.  · Before the procedure, let your health care provider know about all the medical conditions and surgeries you have or have had.  · This is a safe procedure. However, some problems may occur, including damage to nearby structures or organs, bleeding, blood clots, or allergies.  · Follow your health care provider's instructions about eating, drinking, medicines, and other lifestyle changes, such as quitting tobacco use before the procedure.  This information is not intended to replace advice given to you by your health care provider. Make sure you discuss any questions you have with your health care provider.  Document Revised: 07/08/2020 Document Reviewed: 07/08/2020  [email or web address] Patient Education © 2021 [email or web address]      Heart Failure, Diagnosis    Heart failure means that your heart is not able to pump blood in the right way. This makes it hard for your body to work well. Heart failure is usually a long-term (chronic) condition. You must take good care of yourself and follow your treatment plan from your doctor.  What are the causes?  This condition may be caused by:  · High blood pressure.  · Build up of cholesterol and fat in the arteries.  · Heart attack. This injures the heart muscle.  · Heart valves that do not open and close properly.  · Damage of the heart muscle. This is also called cardiomyopathy.  · Lung disease.  · Abnormal heart rhythms.  What increases the risk?  The risk of heart failure goes up as a person ages. This condition is also more likely to develop in people who:  · Are overweight.  · Are male.  · Smoke  or chew tobacco.  · Abuse alcohol or illegal drugs.  · Have taken medicines that can damage the heart.  · Have diabetes.  · Have abnormal heart rhythms.  · Have thyroid problems.  · Have low blood counts (anemia).  What are the signs or symptoms?  Symptoms of this condition include:  · Shortness of breath.  · Coughing.  · Swelling of the feet, ankles, legs, or belly.  · Losing weight for no reason.  · Trouble breathing.  · Waking from sleep because of the need to sit up and get more air.  · Rapid heartbeat.  · Being very tired.  · Feeling dizzy, or feeling like you may pass out (faint).  · Having no desire to eat.  · Feeling like you may vomit (nauseous).  · Peeing (urinating) more at night.  · Feeling confused.  How is this treated?         This condition may be treated with:  · Medicines. These can be given to treat blood pressure and to make the heart muscles stronger.  · Changes in your daily life. These may include eating a healthy diet, staying at a healthy body weight, quitting tobacco and illegal drug use, or doing exercises.  · Surgery. Surgery can be done to open blocked valves, or to put devices in the heart, such as pacemakers.  · A donor heart (heart transplant). You will receive a healthy heart from a donor.  Follow these instructions at home:  · Treat other conditions as told by your doctor. These may include high blood pressure, diabetes, thyroid disease, or abnormal heart rhythms.  · Learn as much as you can about heart failure.  · Get support as you need it.  · Keep all follow-up visits as told by your doctor. This is important.  Summary  · Heart failure means that your heart is not able to pump blood in the right way.  · This condition is caused by high blood pressure, heart attack, or damage of the heart muscle.  · Symptoms of this condition include shortness of breath and swelling of the feet, ankles, legs, or belly. You may also feel very tired or feel like you may vomit.  · You may be treated  with medicines, surgery, or changes in your daily life.  · Treat other health conditions as told by your doctor.  This information is not intended to replace advice given to you by your health care provider. Make sure you discuss any questions you have with your health care provider.  Document Revised: 03/06/2020 Document Reviewed: 03/06/2020  [employer] Patient Education © 2021 [employer]      Acute Coronary Syndrome  Acute coronary syndrome (ACS) is a serious problem in which there is suddenly not enough blood and oxygen reaching the heart. ACS can result in chest pain or a heart attack.  This condition is a medical emergency. If you have any symptoms of this condition, get help right away.  What are the causes?  This condition may be caused by:  · A buildup of fat and cholesterol inside the arteries (atherosclerosis). This is the most common cause. The buildup (plaque) can cause blood vessels in the heart (coronary arteries) to become narrow or blocked, which reduces blood flow to the heart. Plaque can also break off and lead to a clot, which can block an artery and cause a heart attack or stroke.  · Sudden tightening of the muscles around the coronary arteries (coronary spasm).  · Tearing of a coronary artery (spontaneous coronary artery dissection).  · Very low blood pressure (hypotension).  · An abnormal heartbeat (arrhythmia).  · Other medical conditions that cause a decrease of oxygen to the heart, such as anemiaorrespiratory failure.  · Using cocaine or methamphetamine.  What increases the risk?  The following factors may make you more likely to develop this condition:  · Age. The risk for ACS increases as you get older.  · History of chest pain, heart attack, peripheral artery disease, or stroke.  · Having taken chemotherapy or immune-suppressing medicines.  · Being male.  · Family history of chest pain, heart disease, or stroke.  · Smoking.  · Not exercising enough.  · Being overweight.  · High  cholesterol.  · High blood pressure (hypertension).  · Diabetes.  · Excessive alcohol use.  What are the signs or symptoms?  Common symptoms of this condition include:  · Chest pain. The pain may last a long time, or it may stop and come back (recur). It may feel like:  ? Crushing or squeezing.  ? Tightness, pressure, fullness, or heaviness.  · Arm, neck, jaw, or back pain.  · Heartburn or indigestion.  · Shortness of breath.  · Nausea.  · Sudden cold sweats.  · Light-headedness.  · Dizziness or passing out.  · Tiredness (fatigue).  Sometimes there are no symptoms.  How is this diagnosed?  This condition may be diagnosed based on:  · Your medical history and symptoms.  · Imaging tests, such as:  ? An electrocardiogram (ECG). This measures the heart's electrical activity.  ? X-rays.  ? CT scan.  ? A coronary angiogram. For this test, dye is injected into the heart arteries and then X-rays are taken.  ? Myocardial perfusion imaging. This test shows how well blood flows through your heart muscle.  · Blood tests. These may be repeated at certain time intervals.  · Exercise stress testing.  · Echocardiogram. This is a test that uses sound waves to produce detailed images of the heart.  How is this treated?  Treatment for this condition may include:  · Oxygen therapy.  · Medicines, such as:  ? Antiplatelet medicines and blood-thinning medicines, such as aspirin. These help prevent blood clots.  ? Medicine that dissolves any blood clots (fibrinolytic therapy).  ? Blood pressure medicines.  ? Nitroglycerin. This helps widen blood vessels to improve blood flow.  ? Pain medicine.  ? Cholesterol-lowering medicine.  · Surgery, such as:  ? Coronary angioplasty with stent placement. This involves placing a small piece of metal that looks like mesh or a spring into a narrow coronary artery. This widens the artery and keeps it open.  ? Coronary artery bypass surgery. This involves taking a section of a blood vessel from a different  part of your body and placing it on the blocked coronary artery to allow blood to flow around the blockage.  · Cardiac rehabilitation. This is a program that includes exercise training, education, and counseling to help you recover.  Follow these instructions at home:  Eating and drinking  · Eat a heart-healthy diet that includes whole grains, fruits and vegetables, lean proteins, and low-fat or nonfat dairy products.  · Limit how much salt (sodium) you eat as told by your health care provider. Follow instructions from your health care provider about any other eating or drinking restrictions, such as limiting foods that are high in fat and processed sugars.  · Use healthy cooking methods such as roasting, grilling, broiling, baking, poaching, steaming, or stir-frying.  · Work with a dietitian to follow a heart-healthy eating plan.  Medicines  · Take over-the-counter and prescription medicines only as told by your health care provider.  · Do not take these medicines unless your health care provider approves:  ? Vitamin supplements that contain vitamin A or vitamin E.  ? NSAIDs, such as ibuprofen, naproxen, or celecoxib.  ? Hormone replacement therapy that contains estrogen.  · If you are taking blood thinners:  ? Talk with your health care provider before you take any medicines that contain aspirin or NSAIDs. These medicines increase your risk for dangerous bleeding.  ? Take your medicine exactly as told, at the same time every day.  ? Avoid activities that could cause injury or bruising, and follow instructions about how to prevent falls.  ? Wear a medical alert bracelet, and carry a card that lists what medicines you take.  Activity  · Follow your cardiac rehabilitation program. Do exercises as told by your physical therapist.  · Ask your health care provider what activities and exercises are safe for you. Follow his or her instructions about lifting, driving, or climbing stairs.  Lifestyle  · Do not use any  products that contain nicotine or tobacco, such as cigarettes, e-cigarettes, and chewing tobacco. If you need help quitting, ask your health care provider.  · Do not drink alcohol if your health care provider tells you not to drink.  · If you drink alcohol:  ? Limit how much you have to 0-1 drink a day.  ? Be aware of how much alcohol is in your drink. In the [place] one drink equals one 12 oz bottle of beer (355 mL), one 5 oz glass of wine (148 mL), or one 1½ oz glass of hard liquor (44 mL).  · Maintain a healthy weight. If you need to lose weight, work with your health care provider to do so safely.  General instructions  · Tell all the health care providers who provide care for you about your heart condition, including your dentist. This may affect the medicines or treatment you receive.  · Manage any other health conditions you have, such as hypertension or diabetes. These conditions affect your heart.  · Pay attention to your mental health. You may be at higher risk for depression.  ? Find ways to manage stress.  ? Talk to your health care provider about depression screening and treatment.  · Keep your vaccinations up to date.  ? Get the flu shot (influenza vaccine) every year.  ? Get the pneumococcal vaccine if you are age 65 or older.  · If directed, monitor your blood pressure at home.  · Keep all follow-up visits as told by your health care provider. This is important.  Contact a health care provider if you:  · Feel overwhelmed or sad.  · Have trouble doing your daily activities.  Get help right away if you:  · Have pain in your chest, neck, arm, jaw, stomach, or back that recurs, and:  ? It lasts for more than a few minutes.  ? It is not relieved by taking the medicineyour health care provider prescribed.  · Have unexplained:  ? Heavy sweating.  ? Heartburn or indigestion.  ? Nausea or vomiting.  ? Shortness of breath.  ? Difficulty breathing.  ? Fatigue.  ? Nervousness or  anxiety.  ? Weakness.  ? Diarrhea.  ? Dark stools or blood in your stool.  · Have sudden light-headedness or dizziness.  · Have blood pressure that is higher than 180/120.  · Faint.  · Have thoughts about hurting yourself.  These symptoms may represent a serious problem that is an emergency. Do not wait to see if the symptoms will go away. Get medical help right away. Call your local emergency services (911 in the [place] Do not drive yourself to the hospital.   Summary  · Acute coronary syndrome (ACS) is when there is not enough blood and oxygen being supplied to the heart. ACS can result in chest pain or a heart attack.  · Acute coronary syndrome is a medical emergency. If you have any symptoms of this condition, get help right away.  · Treatment includes medicines and procedures to open the blocked arteries and restore blood flow.  This information is not intended to replace advice given to you by your health care provider. Make sure you discuss any questions you have with your health care provider.  Document Revised: 05/20/2020 Document Reviewed: 12/30/2019  [employer] Patient Education © 2021 [employer]      Cardioverter Defibrillator Implantation  An implantable cardioverter defibrillator (ICD) is a device that identifies and corrects abnormal heart rhythms. Cardioverter defibrillator implantation is a surgery to place an ICD under the skin in the chest or abdomen. An ICD has a battery, a small computer (pulse generator), and wires (leads) that go into the heart. The ICD detects and corrects two types of dangerous irregular heart rhythms (arrhythmias):  · A rapid heart rhythm in the lower chambers of the heart (ventricles). This is called ventricular tachycardia.  · The ventricles chu in an uncoordinated way. This is called ventricular fibrillation.  There are different types of ICDs, and the electrical signals from the ICD can be programmed differently based on the condition being treated. The  electrical signals from the ICD can be low-energy pulses, high-energy shocks, or a combination of the two. The low-energy pulses are generally used to restore the heartbeat to normal when it is either too slow (bradycardia) or too fast. These pulses are painless. The high-energy shocks are used to treat abnormal rhythms such as ventricular tachycardia or ventricular fibrillation. This shock may feel like a strong jolt in the chest.  Your health care provider may recommend an ICD if you have:  · Had a ventricular arrhythmia in the past.  · A damaged heart because of a disease or heart condition.  · A weakened heart muscle from a heart attack or cardiac arrest.  · A congenital heart defect.  · Long QT syndrome, which is a disorder of the heart's electrical system.  · Brugada syndrome, which is a condition that causes a disruption of the heart's normal rhythm.  Tell a health care provider about:  · Any allergies you have.  · All medicines you are taking, including vitamins, herbs, eye drops, creams, and over-the-counter medicines.  · Any problems you or family members have had with anesthetic medicines.  · Any blood disorders you have.  · Any surgeries you have had.  · Any medical conditions you have.  · Whether you are pregnant or may be pregnant.  What are the risks?  Generally, this is a safe procedure. However, problems may occur, including:  · Infection.  · Bleeding.  · Allergic reactions to medicines used during the procedure.  · Blood clots.  · Swelling or bruising.  · Damage to nearby structures or organs, such as nerves, lungs, blood vessels, or the heart where the ICD leads or pulse generator is implanted.  What happens before the procedure?  Staying hydrated  Follow instructions from your health care provider about hydration, which may include:  · Up to 2 hours before the procedure - you may continue to drink clear liquids, such as water, clear fruit juice, black coffee, and plain tea.    Eating and drinking  restrictions  Follow instructions from your health care provider about eating and drinking, which may include:  · 8 hours before the procedure - stop eating heavy meals or foods, such as meat, fried foods, or fatty foods.  · 6 hours before the procedure - stop eating light meals or foods, such as toast or cereal.  · 6 hours before the procedure - stop drinking milk or drinks that contain milk.  · 2 hours before the procedure - stop drinking clear liquids.  Medicines  Ask your health care provider about:  · Changing or stopping your regular medicines. This is especially important if you are taking diabetes medicines or blood thinners.  · Taking medicines such as aspirin and ibuprofen. These medicines can thin your blood. Do not take these medicines unless your health care provider tells you to take them.  · Taking over-the-counter medicines, vitamins, herbs, and supplements.  Tests  You may have an exam or testing. These may include:  · Blood tests.  · A test to check the electrical signals in your heart (electrocardiogram, ECG).  · Imaging tests, such as a chest X-ray.  · Echocardiogram. This is an ultrasound of your heart to evaluate your heart structures and function.  · An event monitor or Holter monitor to wear at home.  General instructions  · Do not use any products that contain nicotine or tobacco for at least 4 weeks before the procedure. These products include cigarettes, chewing tobacco, and vaping devices, such as e-cigarettes. If you need help quitting, ask your health care provider.  · Ask your health care provider:  ? How your procedure site will be marked.  ? What steps will be taken to help prevent infection. These may include:  § Removing hair at the surgery site.  § Washing skin with a germ-killing soap.  § Taking antibiotic medicine.  · You may be asked to shower with a germ-killing soap.  · Plan to have a responsible adult take you home from the hospital or clinic.  What happens during the  procedure?    · Small monitors will be put on your body. They will be used to check your heart rate, blood pressure, and oxygen level.  · A pair of sticky pads (defibrillator pads) may be placed on your back and chest. These pads are able to pace your heart as needed during the procedure.  · An IV will be inserted into one of your veins.  · You will be given one or more of the following:  ? A medicine to help you relax (sedative).  ? A medicine to numb the area (local anesthetic).  ? A medicine to make you fall asleep(general anesthetic).  · A small incision will be made to create a deep pocket under the skin of your chest or abdomen.  · Leads will be guided through a blood vessel into your heart and attached to your heart muscles. Depending on the ICD, the leads may go into one ventricle, or they may go into both ventricles and into an upper chamber of the heart. An X-ray machine (fluoroscope) will be used to help guide the leads. The other end of the leads will be attached to the pulse generator.  · The pulse generator will be placed into the pocket under the skin.  · The ICD will be tested, and your health care provider will program the ICD for the condition being treated.  · The incision will be closed with stitches (sutures), skin glue, adhesive strips, or staples.  · A bandage (dressing) will be placed over the incision.  The procedure may vary among health care providers and hospitals.  What happens after the procedure?  · Your blood pressure, heart rate, breathing rate, and blood oxygen level will be monitored until you leave the hospital or clinic. Your health care provider will also monitor your ICD to make sure it is working properly.  · A chest X-ray will be taken to check that the ICD is in the right place.  · Do not raise the arm on the side of your procedure higher than your shoulder for as long as told by your health care provider. This is usually at least 6 weeks.  · You may be given an  identification card explaining that you have an ICD.  · You will be given a remote home monitoring device to use with your ICD to allow your device to communicate with your clinic.  Summary  · An implantable cardioverter defibrillator (ICD) is a device that identifies and corrects abnormal heart rhythms. Cardioverter defibrillator implantation is a surgery to place an ICD under the skin in the chest or abdomen.  · An ICD consists of a battery, a small computer (pulse generator), and wires (leads) that go into the heart.  · During the procedure, the ICD will be tested, and your health care provider will program the ICD for the condition being treated.  · After the procedure, a chest X-ray will be taken to check that the ICD is in the right place.  This information is not intended to replace advice given to you by your health care provider. Make sure you discuss any questions you have with your health care provider.  Document Revised: 06/16/2021 Document Reviewed: 06/16/2021  Elsevier Patient Education © 2021 [employer]

## 2021-11-01 NOTE — PROGRESS NOTES
Subjective     Israel Toure is a 88 y.o. male who presents to day for Cardiomyopathy (presents for abn echo), Shortness of Breath, and Coronary Artery Disease.    CHIEF COMPLIANT  Chief Complaint   Patient presents with   • Cardiomyopathy     presents for abn echo   • Shortness of Breath   • Coronary Artery Disease       Active Problems:  Problem List Items Addressed This Visit        Cardiac and Vasculature    Coronary artery disease involving native coronary artery of native heart without angina pectoris - Primary    Essential hypertension    Cardiomyopathy (HCC)    Chronic systolic CHF (congestive heart failure), NYHA class 3 (HCC)      Other Visit Diagnoses     Abnormal CT of the chest          Problem List:  1.  Coronary artery disease.  1.1.  MI, 1990s, inadequate data.  The patient apparently had catheterization with no follow-up mechanical intervention.  1.2.  Repeat catheterization, 2017, in the setting of unstable angina.  The patient had an occluded right coronary artery with adequate collateral flow.  He had nonobstructive disease in the LAD and circumflex.  Medical management was recommended.  1.3 stress test 10/21: Large inferior wall infarct with moderate juany-infarct ischemia in inferior septal and inferior lateral and diaphragmatic segments with severely depressed post-rest ejection fraction of 24% with global hypokinesis most pronounced in the inferior septal and inferior walls  2.  Ischemic cardiomyopathy  2.1 echocardiogram 10/21: EF 25 to 30%, mild concentric left ventricular hypertrophy with global hypokinesis with akinesis of the septum, grade 2-3 diastolic dysfunction with moderate to severe left atrial enlargement, moderate right atrial enlargement, moderate MR, aortic valve area of 1 cm², trivial AI, moderate TR, pulmonary artery pressures 50-55/25-30  3.  Hypertension  4.  Dyslipidemia  5.  Chronic palpitations with questionable diagnosis of atrial fibrillation, inadequate data.  6.   Diffuse conduction system disease  7.  Chest x-ray identifying moderate sized left pleural effusion, 9/28/2021.   7.1 CTA of chest 10/21: Advanced atherosclerotic plaque formation throughout the coronary arteries, small to moderate loculated left pleural effusion with mild pleural thickening, moderate peripheral parenchymal opacifications in the left upper lobe posteriorly and inferiorly as well as mid and inferior aspect of the left lower lobe stable calcified pleural plaque in the right.  Stable 2.2 x 1.6 rounded atelectasis scarring in the right middle lobe.  Irregular 4.4 x 3.9 cm left paraspinous pleural-based mass inferiorly which appears to invade into the paraspinal fat, 2.5 x 3.5 cm retroperitoneal mass in the left periaortic location    HPI  HPI  Mr. Toure is an 88-year-old male patient who is being followed back up today for ischemic cardiomyopathy and positive stress test.  Patient does have ischemic cardiomyopathy with an ejection fraction of 25 to 30% based on his echocardiogram.  He also had moderate MR and TR and an aortic valve area of 1 cm².  He did undergo a stress test that did showLarge inferior wall infarct with moderate juany-infarct ischemia in inferior septal and inferior lateral and diaphragmatic segments with severely depressed post-rest ejection fraction of 24% with global hypokinesis most pronounced in the inferior septal and inferior walls.  He has had known ischemic cardiomyopathy in the past and has deferred defibrillator placement previously.  He does report some shortness of breath that usually occurs in the morning or increased levels of activity.  He does have some right ankle swelling that has been chronic but has seemed to improved once he was put on Lasix.  We also reviewed a CTA that was been ordered by Antwon Pickering.  It did show continuation of small to moderate loculated left pleural effusion with mild pleural thickening most concerning Blayne was a 4.4 x 3.9 cm  irregular paraspinous/pleural based mass inferiorly which appears to invade the paraspinal fat as well as a 2.5 x 3.5 cm retroperitoneal mass in the left periaortic location.  This was felt most likely to be malignancy and possibly mesothelioma he denied any chest pain, palpitations, syncope, dizziness, lightheadedness, or strokelike symptoms.  PRIOR MEDS  Current Outpatient Medications on File Prior to Visit   Medication Sig Dispense Refill   • ALPRAZolam (XANAX) 1 MG tablet Take 2 mg by mouth At Night As Needed for Anxiety.     • aspirin 81 MG EC tablet Take 81 mg by mouth Daily.     • carvedilol (COREG) 3.125 MG tablet Take 1 tablet by mouth 2 (Two) Times a Day. 180 tablet 3   • doxazosin (CARDURA) 4 MG tablet Take 4 mg by mouth Every Night.     • furosemide (LASIX) 40 MG tablet Take 1 tablet by mouth Daily. 30 tablet 0   • potassium chloride 10 MEQ CR tablet Daily.     • ramipril (ALTACE) 5 MG capsule Take 1 capsule by mouth Daily. 90 capsule 3   • simvastatin (ZOCOR) 40 MG tablet Take 1 tablet by mouth Every Night. 90 tablet 3   • traMADol (ULTRAM) 50 MG tablet Take 50 mg by mouth As Needed for Moderate Pain .       No current facility-administered medications on file prior to visit.       ALLERGIES  Patient has no known allergies.    HISTORY  Past Medical History:   Diagnosis Date   • Acute MI (HCC)    • Cancer (HCC)     melanoma   • COPD (chronic obstructive pulmonary disease) (HCC)    • COVID-19    • Diverticulosis    • Hypertension    • Hypomagnesemia    • Osteoarthritis    • Prostatitis    • Spinal stenosis        Social History     Socioeconomic History   • Marital status:    Tobacco Use   • Smoking status: Former Smoker     Types: Cigarettes     Quit date:      Years since quittin.8   • Smokeless tobacco: Never Used   Substance and Sexual Activity   • Alcohol use: No   • Drug use: No   • Sexual activity: Defer       Family History   Problem Relation Age of Onset   • No Known Problems  "Mother    • No Known Problems Father        Review of Systems   Constitutional: Negative.  Negative for chills, diaphoresis, fatigue and fever.   HENT: Negative.    Eyes: Negative.  Negative for visual disturbance.   Respiratory: Positive for cough (since Covid) and shortness of breath (with increaed activity and in the mornings). Negative for apnea, chest tightness and wheezing.    Cardiovascular: Positive for leg swelling (R ankle, better with Lasix increase  ). Negative for chest pain and palpitations.   Gastrointestinal: Negative.  Negative for abdominal pain, blood in stool, constipation, diarrhea, nausea and vomiting.   Endocrine: Negative.    Genitourinary: Negative.  Negative for hematuria.   Musculoskeletal: Positive for arthralgias, back pain, gait problem (uses a cane for balance), myalgias and neck pain.   Skin: Negative.    Allergic/Immunologic: Negative.    Neurological: Negative for dizziness, syncope, weakness, light-headedness, numbness and headaches.   Hematological: Bruises/bleeds easily.   Psychiatric/Behavioral: Negative.  Negative for sleep disturbance.       Objective     VITALS: /79 (BP Location: Right arm, Patient Position: Sitting)   Pulse 79   Ht 170.2 cm (67.01\")   Wt 68.2 kg (150 lb 6.4 oz)   SpO2 94%   BMI 23.55 kg/m²     LABS:   Lab Results (most recent)     None          IMAGING:   No Images in the past 120 days found..    EXAM:  Physical Exam  Vitals and nursing note reviewed.   Constitutional:       Appearance: He is well-developed.   Eyes:      Pupils: Pupils are equal, round, and reactive to light.   Neck:      Thyroid: No thyroid mass.      Vascular: No carotid bruit or JVD.      Trachea: Trachea and phonation normal.   Cardiovascular:      Rate and Rhythm: Normal rate and regular rhythm.      Pulses:           Radial pulses are 2+ on the right side and 2+ on the left side.      Heart sounds: Murmur heard.   No friction rub. No gallop.    Pulmonary:      Effort: " Pulmonary effort is normal. No respiratory distress.      Breath sounds: Examination of the right-lower field reveals decreased breath sounds. Examination of the left-lower field reveals decreased breath sounds. Decreased breath sounds present. No wheezing or rales.   Abdominal:      General: Bowel sounds are normal.      Palpations: Abdomen is soft.   Musculoskeletal:         General: Swelling present. Normal range of motion.      Cervical back: Neck supple.   Skin:     General: Skin is warm and dry.      Capillary Refill: Capillary refill takes less than 2 seconds.      Findings: No rash.   Neurological:      Mental Status: He is alert and oriented to person, place, and time.   Psychiatric:         Speech: Speech normal.         Behavior: Behavior normal.         Thought Content: Thought content normal.         Judgment: Judgment normal.         Procedure   Procedures       Assessment/Plan    Diagnosis Plan   1. Coronary artery disease involving native coronary artery of native heart without angina pectoris     2. Ischemic cardiomyopathy     3. Essential hypertension     4. Chronic systolic CHF (congestive heart failure), NYHA class 3 ([facility])     5. Abnormal CT of the chest     1.  Patient does have a history of coronary artery disease with ischemic cardiomyopathy which she did not go under a stress test which was positive for  Large inferior wall infarct with moderate juany-infarct ischemia in inferior septal and inferior lateral and diaphragmatic segments with severely depressed post-rest ejection fraction of 24% with global hypokinesis most pronounced in the inferior septal and inferior walls.  We will defer any further testing at this time based on shared decision making until further follow-up of the CT can be done.  CT will be discussed below.  2.  Patient does have ischemic cardiomyopathy in which his ejection fraction is 25 to 30%.  We did have an extensive conversation in regards to ICD placement which he is  previously deferred.  At this time he wishes to continue to defer.  He is on guideline driven medication therapy including carvedilol and Altace at max tolerated doses.  We did discuss increasing carvedilol but it was a combined decision that this would likely causes hypotension to be worse and have him become more symptomatic and put him at risk of injury.  We will continue the carvedilol and Altace at this time.  3.  Patient's blood pressure is well controlled on current blood pressure medication regimen.  No medication changes are warranted at this time.  Patient advised to monitor blood pressure on a daily basis and report any persistent highs or lows.  Set goal blood pressure for patient at 130/80 or below.  Patient has most recently been running a lower blood pressure and would not tolerate increasing medications.  4.  Patient did have abnormal CT that identified a small to moderate loculated left pleural effusion with mild pleural thickening most concerning Blayne was a 4.4 x 3.9 cm irregular paraspinous/pleural based mass inferiorly which appears to invade the paraspinal fat as well as a 2.5 x 3.5 cm retroperitoneal mass in the left periaortic location.  This was felt most likely to be malignancy and possibly mesothelioma..  Patient is to follow-up with Dr. Heck tomorrow for discussion and possible referral for further intervention due to the abnormal CTA.  5.  Informed of signs and symptoms of ACS and advised to seek emergent treatment for any new worsening symptoms.  Patient also advised sooner follow-up as needed.  Also advised to follow-up with family doctor as needed  This note is dictated utilizing voice recognition software.  Although this record has been proof read, transcriptional errors may still be present. If questions occur regarding the content of this record please do not hesitate to call our office.  I have reviewed and confirmed the accuracy of the ROS as documented by the MA/LPN/RN Jozef  NELLA Cazares    Return in about 4 weeks (around 11/29/2021), or if symptoms worsen or fail to improve.    Diagnoses and all orders for this visit:    1. Coronary artery disease involving native coronary artery of native heart without angina pectoris (Primary)    2. Ischemic cardiomyopathy    3. Essential hypertension    4. Chronic systolic CHF (congestive heart failure), NYHA class 3 (HCC)    5. Abnormal CT of the chest        Advance Care Planning   ACP discussion was held with the patient during this visit. Patient does not have an advance directive, declines further assistance.         MEDS ORDERED DURING VISIT:  No orders of the defined types were placed in this encounter.          This document has been electronically signed by NELLA العلي Jr.  November 2, 2021 08:27 EDT

## 2021-11-09 ENCOUNTER — TELEPHONE (OUTPATIENT)
Dept: CARDIOLOGY | Facility: CLINIC | Age: 86
End: 2021-11-09

## 2021-11-09 NOTE — TELEPHONE ENCOUNTER
Has been sent to PCP / pulmonology referral has been sent also       AT [facility]           ----- Message from ELVIN Byrne sent at 11/5/2021 12:17 PM EDT -----  Make sure this is forwarded to the patient's primary care provider.  Patient needs to be seeing hematology/oncology, pulmonology, and as previously recommended otherwise.

## 2022-11-28 DIAGNOSIS — I10 ESSENTIAL HYPERTENSION: ICD-10-CM

## 2022-11-28 RX ORDER — RAMIPRIL 5 MG/1
CAPSULE ORAL
Qty: 90 CAPSULE | Refills: 0 | Status: SHIPPED | OUTPATIENT
Start: 2022-11-28 | End: 2023-02-16

## 2023-01-09 DIAGNOSIS — I10 ESSENTIAL HYPERTENSION: ICD-10-CM

## 2023-01-10 RX ORDER — CARVEDILOL 3.12 MG/1
TABLET ORAL
Qty: 60 TABLET | Refills: 0 | Status: SHIPPED | OUTPATIENT
Start: 2023-01-10 | End: 2023-02-09

## 2023-02-09 DIAGNOSIS — I10 ESSENTIAL HYPERTENSION: ICD-10-CM

## 2023-02-09 RX ORDER — CARVEDILOL 3.12 MG/1
TABLET ORAL
Qty: 60 TABLET | Refills: 0 | Status: SHIPPED | OUTPATIENT
Start: 2023-02-09 | End: 2023-02-16 | Stop reason: SDUPTHER

## 2023-02-16 ENCOUNTER — OFFICE VISIT (OUTPATIENT)
Dept: CARDIOLOGY | Facility: CLINIC | Age: 88
End: 2023-02-16
Payer: MEDICARE

## 2023-02-16 VITALS
DIASTOLIC BLOOD PRESSURE: 69 MMHG | WEIGHT: 154.2 LBS | HEIGHT: 68 IN | SYSTOLIC BLOOD PRESSURE: 104 MMHG | BODY MASS INDEX: 23.37 KG/M2 | HEART RATE: 74 BPM | OXYGEN SATURATION: 100 %

## 2023-02-16 DIAGNOSIS — I10 ESSENTIAL HYPERTENSION: ICD-10-CM

## 2023-02-16 DIAGNOSIS — I50.22 CHRONIC SYSTOLIC CHF (CONGESTIVE HEART FAILURE), NYHA CLASS 3: Primary | ICD-10-CM

## 2023-02-16 DIAGNOSIS — R06.02 SOB (SHORTNESS OF BREATH): ICD-10-CM

## 2023-02-16 DIAGNOSIS — I25.10 CORONARY ARTERY DISEASE INVOLVING NATIVE CORONARY ARTERY OF NATIVE HEART WITHOUT ANGINA PECTORIS: ICD-10-CM

## 2023-02-16 DIAGNOSIS — I48.92 ATRIAL FLUTTER WITH CONTROLLED RESPONSE: ICD-10-CM

## 2023-02-16 PROCEDURE — 99214 OFFICE O/P EST MOD 30 MIN: CPT | Performed by: NURSE PRACTITIONER

## 2023-02-16 RX ORDER — FUROSEMIDE 20 MG/1
20 TABLET ORAL DAILY
COMMUNITY
End: 2023-02-16 | Stop reason: SDUPTHER

## 2023-02-16 RX ORDER — CARVEDILOL 3.12 MG/1
3.12 TABLET ORAL 2 TIMES DAILY
Qty: 60 TABLET | Refills: 11 | Status: SHIPPED | OUTPATIENT
Start: 2023-02-16

## 2023-02-16 RX ORDER — SIMVASTATIN 40 MG
40 TABLET ORAL NIGHTLY
Qty: 90 TABLET | Refills: 3 | Status: SHIPPED | OUTPATIENT
Start: 2023-02-16

## 2023-02-16 RX ORDER — FUROSEMIDE 20 MG/1
20 TABLET ORAL DAILY
Qty: 90 TABLET | Refills: 1 | Status: SHIPPED | OUTPATIENT
Start: 2023-02-16

## 2023-02-16 RX ORDER — POTASSIUM CHLORIDE 750 MG/1
20 TABLET, FILM COATED, EXTENDED RELEASE ORAL DAILY
Qty: 90 TABLET | Refills: 1 | Status: SHIPPED | OUTPATIENT
Start: 2023-02-16

## 2023-02-16 NOTE — PROGRESS NOTES
Subjective     Israel Toure is a 89 y.o. male who presents to day for Follow up from [facility]  (Bleeding from bowels records in media).    CHIEF COMPLIANT  Chief Complaint   Patient presents with   • Follow up from [facility]      Bleeding from bowels records in media       Active Problems:  Problem List Items Addressed This Visit        Cardiac and Vasculature    Coronary artery disease involving native coronary artery of native heart without angina pectoris    Relevant Medications    carvedilol (COREG) 3.125 MG tablet    Essential hypertension    Relevant Medications    carvedilol (COREG) 3.125 MG tablet    furosemide (LASIX) 20 MG tablet    Chronic systolic CHF (congestive heart failure), NYHA class 3 (HCC) - Primary    Relevant Medications    carvedilol (COREG) 3.125 MG tablet   Other Visit Diagnoses     SOB (shortness of breath)        Atrial flutter with controlled response (HCC)        Relevant Medications    carvedilol (COREG) 3.125 MG tablet      Problem List:  1.  Coronary artery disease.  1.1.  MI, 1990s, inadequate data.  The patient apparently had catheterization with no follow-up mechanical intervention.  1.2.  Repeat catheterization, 2017, in the setting of unstable angina.  The patient had an occluded right coronary artery with adequate collateral flow.  He had nonobstructive disease in the LAD and circumflex.  Medical management was recommended.  1.3 stress test 10/21: Large inferior wall infarct with moderate juany-infarct ischemia in inferior septal and inferior lateral and diaphragmatic segments with severely depressed post-rest ejection fraction of 24% with global hypokinesis most pronounced in the inferior septal and inferior walls  2.  Ischemic cardiomyopathy  2.1 echocardiogram 10/21: EF 25 to 30%, mild concentric left ventricular hypertrophy with global hypokinesis with akinesis of the septum, grade 2-3 diastolic dysfunction with moderate to severe left atrial enlargement, moderate right atrial  enlargement, moderate MR, aortic valve area of 1 cm², trivial AI, moderate TR, pulmonary artery pressures 50-55/25-30  3.  Hypertension  4.  Dyslipidemia  5.  Chronic palpitations with questionable diagnosis of atrial fibrillation, inadequate data.  6.  Diffuse conduction system disease  7.  Chest x-ray identifying moderate sized left pleural effusion, 9/28/2021.   7.1 CTA of chest 10/21: Advanced atherosclerotic plaque formation throughout the coronary arteries, small to moderate loculated left pleural effusion with mild pleural thickening, moderate peripheral parenchymal opacifications in the left upper lobe posteriorly and inferiorly as well as mid and inferior aspect of the left lower lobe stable calcified pleural plaque in the right.  Stable 2.2 x 1.6 rounded atelectasis scarring in the right middle lobe.  Irregular 4.4 x 3.9 cm left paraspinous pleural-based mass inferiorly which appears to invade into the paraspinal fat, 2.5 x 3.5 cm retroperitoneal mass in the left periaortic location    ZEYNEP Rodriguez is an 89-year-old male being following up today for heart failure with reduced ejection fraction.   He is currently on carvedilol. Today, his blood pressure is 104/69 mmHg while heart rate is 74 bpm. The patient's female  notes blood pressures as 80s/50s mmHg before discontinuing ramipril, 5 mg. The ramipril was discontinued due to hypotension. She adds that the lowest reading has been 99 mmHg systolic since discontinuation.     He also has a history of coronary artery disease for which he is on a high-intensity statin therapy of simvastatin.   He is also on antiplatelet therapy of aspirin. He was recently admitted to the hospital for rectal bleeding. He is accompanied by an adult female.    A moderately large left pleural effusion with loculation was indicated on a recent CT scan.  He is was identified to have malignancy (likely mesothelioma )on CT scan of the chest in October 2021 and had a  repeat CT scan that show comparable lesions.  However the pericardial effusion seems to have increased.He reports mild dyspnea that is worse when he wakes up in the morning. He is able to sleep only in supine position due to bilateral shoulder pain.  He denies any orthopnea. He notes COVID-19 infection in . He continues on Lasix, 20 mg.    The patient's female  states that he is in need of medication refills.    He denies any chest pain, palpitations, dizziness, lightheadedness, syncope, PND, orthopnea, or strokelike symptoms.    PRIOR MEDS  Current Outpatient Medications on File Prior to Visit   Medication Sig Dispense Refill   • ALPRAZolam (XANAX) 1 MG tablet Take 1 mg by mouth At Night As Needed for Anxiety.     • aspirin 81 MG EC tablet Take 81 mg by mouth Daily.     • traMADol (ULTRAM) 50 MG tablet Take 50 mg by mouth As Needed for Moderate Pain .       No current facility-administered medications on file prior to visit.       ALLERGIES  Patient has no known allergies.    HISTORY  Past Medical History:   Diagnosis Date   • Acute MI (HCC)    • Atrial fibrillation (HCC)    • Bleeding 2023    from bowels   • Cancer (HCC)     melanoma   • COPD (chronic obstructive pulmonary disease) (HCC)    • COVID-19    • Diverticulosis    • Hypertension    • Hypomagnesemia    • Osteoarthritis    • Prostatitis    • Spinal stenosis        Social History     Socioeconomic History   • Marital status:    Tobacco Use   • Smoking status: Former     Types: Cigarettes     Quit date:      Years since quittin.1   • Smokeless tobacco: Never   Substance and Sexual Activity   • Alcohol use: No   • Drug use: No   • Sexual activity: Defer       Family History   Problem Relation Age of Onset   • No Known Problems Mother    • No Known Problems Father        Review of Systems   Constitutional: Positive for fatigue. Negative for chills and fever.   HENT: Negative for congestion, rhinorrhea and sore throat.   "  Respiratory: Positive for shortness of breath. Negative for chest tightness.    Cardiovascular: Positive for leg swelling (ankles). Negative for chest pain and palpitations.   Gastrointestinal: Positive for constipation. Negative for diarrhea and nausea.   Musculoskeletal: Positive for arthralgias and back pain. Negative for neck pain.   Allergic/Immunologic: Negative for environmental allergies and food allergies.   Neurological: Positive for weakness. Negative for dizziness, syncope and light-headedness.   Hematological: Bruises/bleeds easily.   Psychiatric/Behavioral: Negative for sleep disturbance.       Objective     VITALS: /69 (BP Location: Left arm, Patient Position: Sitting, Cuff Size: Adult)   Pulse 74   Ht 172.7 cm (68\")   Wt 69.9 kg (154 lb 3.2 oz)   SpO2 100%   BMI 23.45 kg/m²     LABS:   Lab Results (most recent)     None          IMAGING:   No Images in the past 120 days found..    EXAM:  Physical Exam  Vitals and nursing note reviewed.   Constitutional:       Appearance: He is well-developed.   HENT:      Head: Normocephalic and atraumatic.   Eyes:      Pupils: Pupils are equal, round, and reactive to light.   Neck:      Vascular: No carotid bruit or JVD.   Cardiovascular:      Rate and Rhythm: Normal rate and regular rhythm.      Pulses:           Carotid pulses are 2+ on the right side and 2+ on the left side.       Radial pulses are 2+ on the right side and 2+ on the left side.        Posterior tibial pulses are 2+ on the right side and 2+ on the left side.      Heart sounds: Murmur heard.     No gallop.   Pulmonary:      Effort: Pulmonary effort is normal. No respiratory distress.      Breath sounds: Decreased air movement present. Examination of the left-middle field reveals decreased breath sounds. Examination of the left-lower field reveals decreased breath sounds. Decreased breath sounds and rales present.   Abdominal:      General: Bowel sounds are normal. There is no " distension.      Palpations: Abdomen is soft.      Tenderness: There is no abdominal tenderness.   Musculoskeletal:         General: No swelling. Normal range of motion.      Cervical back: Neck supple.   Skin:     General: Skin is warm and dry.      Coloration: Skin is pale.   Neurological:      Mental Status: He is alert and oriented to person, place, and time.      Cranial Nerves: No cranial nerve deficit.      Sensory: No sensory deficit.   Psychiatric:         Speech: Speech normal.         Behavior: Behavior normal.         Thought Content: Thought content normal.         Judgment: Judgment normal.         Procedure   Procedures       Assessment & Plan    Diagnosis Plan   1. Chronic systolic CHF (congestive heart failure), NYHA class 3 ([facility])        2. Essential hypertension  carvedilol (COREG) 3.125 MG tablet      3. Coronary artery disease involving native coronary artery of native heart without angina pectoris        4. SOB (shortness of breath)        5. Atrial flutter with controlled response ([facility])          1.  Patient does have a large pleural effusion on the left side.  This is felt to be associated with his malignancy.  We did have an extensive conversation in regards to pleurocentesis to help drain the fluid all that may help with his breathing.  Pleurocentesis and echocardiogram are highly recommended. The patient will alert us if shortness of breath worsens, for which we would then complete a chest x-ray before moving forward with pleurocentesis.  He wishes to defer pleurocentesis or echocardiogram at this time.  2.  He will continue his Lasix 20 mg daily with his carvedilol and potassium.  I am concerned to increase his Lasix due to low normal blood pressures and risk of falling.  3.  Patient's blood pressure is controlled on current blood pressure medication regimen.  No medication changes are warranted at this time.  Patient advised to monitor blood pressure on a daily basis and report any  persistent highs or lows.  Set goal blood pressure for patient at 130/80 or below.  4.  Patient does have a history of atrial fibrillation which she is antiplatelet therapy of aspirin.  5.  Informed of signs and symptoms of ACS and advised to seek emergent treatment for any new worsening symptoms.  Patient also advised sooner follow-up as needed.  Also advised to follow-up with family doctor as needed  This note is dictated utilizing voice recognition software.  Although this record has been proof read, transcriptional errors may still be present. If questions occur regarding the content of this record please do not hesitate to call our office.  I have reviewed and confirmed the accuracy of the ROS as documented by the MA/LPN/RN NELLA العلي    Patient reported that he would much rather spend his good days not in a doctor's office or in the hospital.  We had an extensive conversation that we would schedule a 6-month follow-up only with the exception that if he starts having worsening symptoms he will notify us and reevaluation will take place.  Return for follow-up in 6 months or sooner if needed.      No follow-ups on file.    Diagnoses and all orders for this visit:    1. Chronic systolic CHF (congestive heart failure), NYHA class 3 (HCC) (Primary)    2. Essential hypertension  -     carvedilol (COREG) 3.125 MG tablet; Take 1 tablet by mouth 2 (Two) Times a Day.  Dispense: 60 tablet; Refill: 11    3. Coronary artery disease involving native coronary artery of native heart without angina pectoris    4. SOB (shortness of breath)    5. Atrial flutter with controlled response (HCC)    Other orders  -     furosemide (LASIX) 20 MG tablet; Take 1 tablet by mouth Daily.  Dispense: 90 tablet; Refill: 1  -     potassium chloride 10 MEQ CR tablet; Take 2 tablets by mouth Daily.  Dispense: 90 tablet; Refill: 1  -     simvastatin (ZOCOR) 40 MG tablet; Take 1 tablet by mouth Every Night.  Dispense: 90 tablet; Refill:  3        Israel Toure  reports that he quit smoking about 33 years ago. His smoking use included cigarettes. He has never used smokeless tobacco.. I have educated him on the risk of diseases from using tobacco products  Advance Care Planning   ACP discussion was held with the patient during this visit. Patient has an advance directive in EMR which is still valid.        MEDS ORDERED DURING VISIT:  New Medications Ordered This Visit   Medications   • carvedilol (COREG) 3.125 MG tablet     Sig: Take 1 tablet by mouth 2 (Two) Times a Day.     Dispense:  60 tablet     Refill:  11   • furosemide (LASIX) 20 MG tablet     Sig: Take 1 tablet by mouth Daily.     Dispense:  90 tablet     Refill:  1   • potassium chloride 10 MEQ CR tablet     Sig: Take 2 tablets by mouth Daily.     Dispense:  90 tablet     Refill:  1   • simvastatin (ZOCOR) 40 MG tablet     Sig: Take 1 tablet by mouth Every Night.     Dispense:  90 tablet     Refill:  3           This document has been electronically signed by NELLA العلي Jr.  February 17, 2023 17:10 EST      Transcribed from ambient dictation for NELLA العلي by Eriberto Glass.  02/16/23   21:26 EST    Patient or patient representative verbalized consent to the visit recording.  I have personally performed the services described in this document as transcribed by the above individual, and it is both accurate and complete.